# Patient Record
Sex: FEMALE | Race: WHITE | NOT HISPANIC OR LATINO | Employment: FULL TIME | URBAN - METROPOLITAN AREA
[De-identification: names, ages, dates, MRNs, and addresses within clinical notes are randomized per-mention and may not be internally consistent; named-entity substitution may affect disease eponyms.]

---

## 2018-07-27 ENCOUNTER — APPOINTMENT (EMERGENCY)
Dept: ULTRASOUND IMAGING | Facility: HOSPITAL | Age: 28
End: 2018-07-27
Payer: COMMERCIAL

## 2018-07-27 ENCOUNTER — HOSPITAL ENCOUNTER (EMERGENCY)
Facility: HOSPITAL | Age: 28
Discharge: HOME | End: 2018-07-27
Attending: EMERGENCY MEDICINE | Admitting: EMERGENCY MEDICINE
Payer: COMMERCIAL

## 2018-07-27 VITALS
SYSTOLIC BLOOD PRESSURE: 143 MMHG | HEIGHT: 66 IN | RESPIRATION RATE: 16 BRPM | HEART RATE: 107 BPM | OXYGEN SATURATION: 99 % | WEIGHT: 244.2 LBS | BODY MASS INDEX: 39.25 KG/M2 | TEMPERATURE: 99.3 F | DIASTOLIC BLOOD PRESSURE: 71 MMHG

## 2018-07-27 DIAGNOSIS — R10.9 ABDOMINAL PAIN DURING PREGNANCY IN FIRST TRIMESTER: Primary | ICD-10-CM

## 2018-07-27 DIAGNOSIS — O26.891 ABDOMINAL PAIN DURING PREGNANCY IN FIRST TRIMESTER: Primary | ICD-10-CM

## 2018-07-27 LAB
ANION GAP SERPL CALCULATED.3IONS-SCNC: 9 MMOL/L (ref 4–13)
B-HCG SERPL-ACNC: ABNORMAL MIU/ML
BACTERIA UR QL AUTO: ABNORMAL /HPF
BASOPHILS # BLD AUTO: 0.03 THOUSANDS/ΜL (ref 0–0.1)
BASOPHILS NFR BLD AUTO: 0 % (ref 0–1)
BILIRUB UR QL STRIP: ABNORMAL
BUN SERPL-MCNC: 7 MG/DL (ref 5–25)
CALCIUM SERPL-MCNC: 8.7 MG/DL (ref 8.3–10.1)
CHLORIDE SERPL-SCNC: 101 MMOL/L (ref 100–108)
CLARITY UR: ABNORMAL
CO2 SERPL-SCNC: 22 MMOL/L (ref 21–32)
COLOR UR: ABNORMAL
CREAT SERPL-MCNC: 0.55 MG/DL (ref 0.6–1.3)
EOSINOPHIL # BLD AUTO: 0.13 THOUSAND/ΜL (ref 0–0.61)
EOSINOPHIL NFR BLD AUTO: 1 % (ref 0–6)
ERYTHROCYTE [DISTWIDTH] IN BLOOD BY AUTOMATED COUNT: 13.8 % (ref 11.6–15.1)
EXT PREG TEST URINE: POSITIVE
GFR SERPL CREATININE-BSD FRML MDRD: 130 ML/MIN/1.73SQ M
GLUCOSE SERPL-MCNC: 76 MG/DL (ref 65–140)
GLUCOSE UR STRIP-MCNC: NEGATIVE MG/DL
HCT VFR BLD AUTO: 38.8 % (ref 34.8–46.1)
HGB BLD-MCNC: 13.1 G/DL (ref 11.5–15.4)
HGB UR QL STRIP.AUTO: NEGATIVE
KETONES UR STRIP-MCNC: ABNORMAL MG/DL
LEUKOCYTE ESTERASE UR QL STRIP: ABNORMAL
LYMPHOCYTES # BLD AUTO: 2.27 THOUSANDS/ΜL (ref 0.6–4.47)
LYMPHOCYTES NFR BLD AUTO: 20 % (ref 14–44)
MCH RBC QN AUTO: 27 PG (ref 26.8–34.3)
MCHC RBC AUTO-ENTMCNC: 33.8 G/DL (ref 31.4–37.4)
MCV RBC AUTO: 80 FL (ref 82–98)
MONOCYTES # BLD AUTO: 0.82 THOUSAND/ΜL (ref 0.17–1.22)
MONOCYTES NFR BLD AUTO: 7 % (ref 4–12)
NEUTROPHILS # BLD AUTO: 8.11 THOUSANDS/ΜL (ref 1.85–7.62)
NEUTS SEG NFR BLD AUTO: 71 % (ref 43–75)
NITRITE UR QL STRIP: NEGATIVE
NON-SQ EPI CELLS URNS QL MICRO: ABNORMAL /HPF
PH UR STRIP.AUTO: 6 [PH] (ref 4.5–8)
PLATELET # BLD AUTO: 365 THOUSANDS/UL (ref 149–390)
PMV BLD AUTO: 9.3 FL (ref 8.9–12.7)
POTASSIUM SERPL-SCNC: 4 MMOL/L (ref 3.5–5.3)
PROT UR STRIP-MCNC: NEGATIVE MG/DL
RBC # BLD AUTO: 4.85 MILLION/UL (ref 3.81–5.12)
RBC #/AREA URNS AUTO: ABNORMAL /HPF
SODIUM SERPL-SCNC: 132 MMOL/L (ref 136–145)
SP GR UR STRIP.AUTO: 1.02 (ref 1–1.03)
UROBILINOGEN UR QL STRIP.AUTO: 0.2 E.U./DL
WBC # BLD AUTO: 11.36 THOUSAND/UL (ref 4.31–10.16)
WBC #/AREA URNS AUTO: ABNORMAL /HPF

## 2018-07-27 PROCEDURE — 36415 COLL VENOUS BLD VENIPUNCTURE: CPT | Performed by: EMERGENCY MEDICINE

## 2018-07-27 PROCEDURE — 81001 URINALYSIS AUTO W/SCOPE: CPT

## 2018-07-27 PROCEDURE — 96374 THER/PROPH/DIAG INJ IV PUSH: CPT

## 2018-07-27 PROCEDURE — 76801 OB US < 14 WKS SINGLE FETUS: CPT

## 2018-07-27 PROCEDURE — 85025 COMPLETE CBC W/AUTO DIFF WBC: CPT | Performed by: EMERGENCY MEDICINE

## 2018-07-27 PROCEDURE — 80048 BASIC METABOLIC PNL TOTAL CA: CPT | Performed by: EMERGENCY MEDICINE

## 2018-07-27 PROCEDURE — 84702 CHORIONIC GONADOTROPIN TEST: CPT | Performed by: EMERGENCY MEDICINE

## 2018-07-27 PROCEDURE — 81025 URINE PREGNANCY TEST: CPT | Performed by: EMERGENCY MEDICINE

## 2018-07-27 PROCEDURE — 99284 EMERGENCY DEPT VISIT MOD MDM: CPT

## 2018-07-27 RX ORDER — ONDANSETRON 2 MG/ML
4 INJECTION INTRAMUSCULAR; INTRAVENOUS ONCE
Status: COMPLETED | OUTPATIENT
Start: 2018-07-27 | End: 2018-07-27

## 2018-07-27 RX ADMIN — ONDANSETRON 4 MG: 2 INJECTION INTRAMUSCULAR; INTRAVENOUS at 11:57

## 2018-07-27 NOTE — DISCHARGE INSTRUCTIONS
Abdominal Pain in Pregnancy   WHAT YOU NEED TO KNOW:   Abdominal pain during pregnancy is common  Some of the causes include heartburn, constipation, gas, false labor, and round ligament pain  Round ligament pain is caused by stretching of the ligaments that support your uterus  Abdominal pain may be caused by a health problem, such as a stomach virus or appendicitis (inflammation of the appendix)  The pain may also be caused by a problem with your pregnancy, such as a threatened miscarriage or  labor  DISCHARGE INSTRUCTIONS:   Follow up with your obstetrician within 3 days:  Write down your questions so you remember to ask them during your visits  Self-care:   · Rest may help to relieve round ligament pain  Ask your healthcare provider about other ways to relieve this pain, such as a supportive belt or pregnancy exercises  · Use a heating pad set to the lowest setting or a hot compress to apply heat to your abdomen  Do this for 20 to 30 minutes every 2 hours for as many days as directed  · Avoid quick changes in position or movements that cause pain  · Do not lie flat in bed or bend over if you have heartburn  Ask your obstetrician if you should make any changes to the foods you eat  Ask if you can take any medicines for heartburn  · Eat more fiber and drink more liquids to relieve constipation  Fiber is found in fruits, vegetables, and whole-grain foods, such as whole-wheat bread and cereals  Ask how much liquid to drink each day and which liquids are best for you  Contact your obstetrician if:  · You continue to have abdominal pain that cannot be relieved  · You have a fever  · You have questions or concerns about your condition or care  Return to the emergency department if:   · You have sudden, severe pain or cramps that are so bad that you cannot walk or talk  · You have a fast heartbeat, shortness of breath, and you feel lightheaded or faint       · You have vaginal bleeding or discharge  · You have nausea, vomiting, fever, and severe pain on your right side  © 2017 [address] Information is for End User's use only and may not be sold, redistributed or otherwise used for commercial purposes  All illustrations and images included in CareNotes® are the copyrighted property of [employer]  or [employer]  The above information is an  only  It is not intended as medical advice for individual conditions or treatments  Talk to your doctor, nurse or pharmacist before following any medical regimen to see if it is safe and effective for you

## 2018-07-27 NOTE — ED PROVIDER NOTES
History  Chief Complaint   Patient presents with    Abdominal Pain Pregnant     patient reports having abdominal pain (sharp) with "ovary pain" that started today  nausea present  patient reports taking +pregnancy test "2 sundays ago"  OBGYN appt  on August 6h     34year-old female presents with chief complaint of bilateral lower abdominal pain which she describes as ovary pain  Onset was today  Patient reports she is approximately 2 weeks late for her period and took a pregnancy test 2 weeks ago which was reportedly positive  Patient has a history of 1 prior pregnancy resulting in a live birth delivered vaginally  No fevers, chills, dysuria, increased urinary frequency  Patient does report some mild right flank pain  Patient states that she has had a cold recently and has some abdominal pain with coughing as well  History provided by:  Patient   used: No    Abdominal Pain   Pain location: lower abdomen bilaterally  Pain quality: aching and sharp    Pain radiates to:  Does not radiate  Pain severity:  Mild  Onset quality:  Sudden  Duration:  1 day  Timing:  Constant  Progression:  Unchanged  Chronicity:  New  Relieved by:  Nothing  Worsened by:  Palpation  Ineffective treatments:  None tried  Associated symptoms: cough and nausea    Associated symptoms: no chest pain, no chills, no constipation, no diarrhea, no dysuria, no fever, no hematuria, no shortness of breath and no vomiting    Risk factors: obesity        None       History reviewed  No pertinent past medical history  History reviewed  No pertinent surgical history  History reviewed  No pertinent family history  I have reviewed and agree with the history as documented      Social History   Substance Use Topics    Smoking status: Current Every Day Smoker     Packs/day: 1 00     Types: Cigarettes    Smokeless tobacco: Never Used      Comment: 1 pack every 3-4 days    Alcohol use No        Review of Systems Constitutional: Negative for chills, diaphoresis and fever  Respiratory: Positive for cough  Negative for shortness of breath  Cardiovascular: Negative for chest pain and palpitations  Gastrointestinal: Positive for abdominal pain and nausea  Negative for constipation, diarrhea and vomiting  Genitourinary: Negative for dysuria, frequency and hematuria  Skin: Negative for rash  All other systems reviewed and are negative  Physical Exam  Physical Exam   Constitutional: She is oriented to person, place, and time  She appears well-developed and well-nourished  She appears distressed (mild)  HENT:   Head: Normocephalic and atraumatic  Eyes: EOM are normal  Pupils are equal, round, and reactive to light  Neck: Normal range of motion  No JVD present  Cardiovascular: Normal rate, regular rhythm, normal heart sounds and intact distal pulses  Exam reveals no gallop and no friction rub  No murmur heard  Pulmonary/Chest: Effort normal and breath sounds normal  No respiratory distress  She has no wheezes  She has no rales  She exhibits no tenderness  Abdominal: Soft  She exhibits no distension and no mass  There is tenderness (mild bilateral pelvic)  There is no rebound  No hernia  Musculoskeletal: Normal range of motion  She exhibits no tenderness  Neurological: She is alert and oriented to person, place, and time  Skin: Skin is warm and dry  Psychiatric: She has a normal mood and affect  Her behavior is normal  Judgment and thought content normal    Nursing note and vitals reviewed        Vital Signs  ED Triage Vitals [07/27/18 1123]   Temperature Pulse Respirations Blood Pressure SpO2   99 3 °F (37 4 °C) (!) 107 16 143/71 99 %      Temp Source Heart Rate Source Patient Position - Orthostatic VS BP Location FiO2 (%)   Oral Monitor Sitting Left arm --      Pain Score       2           Vitals:    07/27/18 1123   BP: 143/71   Pulse: (!) 107   Patient Position - Orthostatic VS: Sitting Visual Acuity      ED Medications  Medications   ondansetron (ZOFRAN) injection 4 mg (4 mg Intravenous Given 7/27/18 1157)       Diagnostic Studies  Results Reviewed     Procedure Component Value Units Date/Time    Quantitative hCG [[id number]]  (Abnormal) Collected:  07/27/18 1155    Lab Status:  Final result Specimen:  Blood from Arm, Right Updated:  07/27/18 1254     HCG, Quant 24,368 (H) mIU/mL     Narrative:          Expected Ranges:     Approximate               Approximate HCG  Gestation age          Concentration ( mIU/mL)  _____________          ______________________   Melvin Perking                      HCG values  0 2-1                       5-50  1-2                           2-3                         100-5000  3-4                         500-83743  4-5                         1000-28778  5-6                         [id number]  6-8                         [id number]  8-12                        [id number]    Basic metabolic panel [[id number]]  (Abnormal) Collected:  07/27/18 1155    Lab Status:  Final result Specimen:  Blood from Arm, Right Updated:  07/27/18 1252     Sodium 132 (L) mmol/L      Potassium 4 0 mmol/L      Chloride 101 mmol/L      CO2 22 mmol/L      Anion Gap 9 mmol/L      BUN 7 mg/dL      Creatinine 0 55 (L) mg/dL      Glucose 76 mg/dL      Calcium 8 7 mg/dL      eGFR 130 ml/min/1 73sq m     Narrative:         National Kidney Disease Education Program recommendations are as follows:  GFR calculation is accurate only with a steady state creatinine  Chronic Kidney disease less than 60 ml/min/1 73 sq  meters  Kidney failure less than 15 ml/min/1 73 sq  meters      Urine Microscopic [[id number]]  (Abnormal) Collected:  07/27/18 1158    Lab Status:  Final result Specimen:  Urine from Urine, Clean Catch Updated:  07/27/18 1225     RBC, UA None Seen /hpf      WBC, UA 2-4 (A) /hpf      Epithelial Cells Occasional /hpf      Bacteria, UA None Seen /hpf     CBC and differential [[id number]] (Abnormal) Collected:  07/27/18 1155    Lab Status:  Final result Specimen:  Blood from Arm, Right Updated:  07/27/18 1207     WBC 11 36 (H) Thousand/uL      RBC 4 85 Million/uL      Hemoglobin 13 1 g/dL      Hematocrit 38 8 %      MCV 80 (L) fL      MCH 27 0 pg      MCHC 33 8 g/dL      RDW 13 8 %      MPV 9 3 fL      Platelets 331 Thousands/uL      Neutrophils Relative 71 %      Lymphocytes Relative 20 %      Monocytes Relative 7 %      Eosinophils Relative 1 %      Basophils Relative 0 %      Neutrophils Absolute 8 11 (H) Thousands/µL      Lymphocytes Absolute 2 27 Thousands/µL      Monocytes Absolute 0 82 Thousand/µL      Eosinophils Absolute 0 13 Thousand/µL      Basophils Absolute 0 03 Thousands/µL     ED Urine Macroscopic [[id number]]  (Abnormal) Collected:  07/27/18 1158    Lab Status:  Final result Specimen:  Urine Updated:  07/27/18 1150     Color, UA Niki     Clarity, UA Cloudy     pH, UA 6 0     Leukocytes, UA Small (A)     Nitrite, UA Negative     Protein, UA Negative mg/dl      Glucose, UA Negative mg/dl      Ketones, UA 15 (1+) (A) mg/dl      Urobilinogen, UA 0 2 E U /dl      Bilirubin, UA Interference- unable to analyze (A)     Blood, UA Negative     Specific Gravity, UA 1 025    Narrative:       CLINITEK RESULT    POCT pregnancy, urine [[id number]]  (Normal) Resulted:  07/27/18 1147    Lab Status:  Final result Updated:  07/27/18 1147     EXT PREG TEST UR (Ref: Negative) Positive                 US OB < 14 weeks with transvaginal   Final Result by Lisbeth Alberts MD (07/27 1252)      Single live intrauterine gestation at 6 weeks 1 days (range +/- 3 days)  ANNA of 3/20/2019              Workstation performed: [id number]                    Procedures  Procedures       Phone Contacts  ED Phone Contact    ED Course                               MDM  Number of Diagnoses or Management Options  Abdominal pain during pregnancy in first trimester: new and requires workup  Diagnosis management comments: Background: 1st trimester abdominal/pelvic pain    Differential DX includes but is not limited to: ectopic pregnancy, non-specific abdominal pain related to pregnancy, colitis, enteritis,     Plan: cbc, metabolic panel, quantitative hcg, ultrasound       Amount and/or Complexity of Data Reviewed  Clinical lab tests: ordered and reviewed  Tests in the radiology section of CPT®: ordered and reviewed    Risk of Complications, Morbidity, and/or Mortality  Presenting problems: high  Diagnostic procedures: high  Management options: high    Patient Progress  Patient progress: stable    CritCare Time    Disposition  Final diagnoses:   Abdominal pain during pregnancy in first trimester     Time reflects when diagnosis was documented in both MDM as applicable and the Disposition within this note     Time User Action Codes Description Comment    7/27/2018 12:59 PM Gabby Pulse Add [O26 [id number],  R10 9] Abdominal pain during pregnancy in first trimester       ED Disposition     ED Disposition Condition Comment    Discharge  Gini Speaker discharge to home/self care  Condition at discharge: Good        Follow-up Information     Follow up With Specialties Details Why Contact Info    your OBGYN  Schedule an appointment as soon as possible for a visit            Patient's Medications    No medications on file     No discharge procedures on file      ED Provider  Electronically Signed by           Star Don MD  07/27/18 1300

## 2018-09-12 ENCOUNTER — HOSPITAL ENCOUNTER (EMERGENCY)
Facility: HOSPITAL | Age: 28
Discharge: HOME | End: 2018-09-12
Attending: EMERGENCY MEDICINE
Payer: COMMERCIAL

## 2018-09-12 VITALS
HEART RATE: 99 BPM | TEMPERATURE: 99 F | DIASTOLIC BLOOD PRESSURE: 79 MMHG | RESPIRATION RATE: 18 BRPM | BODY MASS INDEX: 42.65 KG/M2 | HEIGHT: 65 IN | OXYGEN SATURATION: 100 % | WEIGHT: 256 LBS | SYSTOLIC BLOOD PRESSURE: 140 MMHG

## 2018-09-12 DIAGNOSIS — K02.9 PAIN DUE TO DENTAL CARIES: Primary | ICD-10-CM

## 2018-09-12 PROCEDURE — 99283 EMERGENCY DEPT VISIT LOW MDM: CPT

## 2018-09-12 RX ORDER — IBUPROFEN 600 MG/1
600 TABLET ORAL ONCE
Status: DISCONTINUED | OUTPATIENT
Start: 2018-09-12 | End: 2018-09-12

## 2018-09-12 RX ORDER — PENICILLIN V POTASSIUM 500 MG/1
500 TABLET ORAL 4 TIMES DAILY
Qty: 40 TABLET | Refills: 0 | Status: SHIPPED | OUTPATIENT
Start: 2018-09-12 | End: 2018-09-19

## 2018-09-13 NOTE — ED PROVIDER NOTES
History  Chief Complaint   Patient presents with    Dental Pain     patient reports having dental pain lower right jaw for 2 days, abscess  difficilty with eating/swallowing  denies earache/throat ache  61-year-old pregnant female, 13 weeks gestation, presents for evaluation of dental pain  Broke a right lower molar a long time ago, but in the last couple of days has had pain in that area radiating to her jaw  No abscess noted  No swelling of her face  She does not notice any change in her voice  No fever or chills  Able to open mouth fully  Complains of a sore throat  No difficulty breathing  Has been taking Tylenol for the pain  Has an appointment with a dentist tomorrow  None       History reviewed  No pertinent past medical history  History reviewed  No pertinent surgical history  History reviewed  No pertinent family history  I have reviewed and agree with the history as documented  Social History   Substance Use Topics    Smoking status: Former Smoker     Packs/day: 1 00     Types: Cigarettes     Quit date: 8/19/2018    Smokeless tobacco: Never Used    Alcohol use No        Review of Systems   Constitutional: Negative for activity change, chills, fatigue and fever  HENT: Positive for dental problem  Negative for congestion, ear pain, facial swelling, nosebleeds, postnasal drip, rhinorrhea, sinus pain, sinus pressure, sore throat, trouble swallowing and voice change  Eyes: Negative for visual disturbance  Respiratory: Negative for cough, chest tightness and shortness of breath  Cardiovascular: Negative for chest pain, palpitations and leg swelling  Gastrointestinal: Negative for abdominal pain, blood in stool, constipation, diarrhea, nausea and vomiting  Genitourinary: Negative for dysuria, flank pain, frequency and hematuria  Musculoskeletal: Negative for arthralgias, back pain, neck pain and neck stiffness  Skin: Negative for rash and wound  Neurological: Negative for dizziness, seizures, syncope, light-headedness, numbness and headaches  All other systems reviewed and are negative  Physical Exam  Physical Exam   Constitutional: She is oriented to person, place, and time  She appears well-developed and well-nourished  No distress  Nontoxic appearance   HENT:   Head: Normocephalic and atraumatic  Right Ear: External ear normal    Left Ear: External ear normal    Mouth/Throat: Oropharynx is clear and moist  No oral lesions  No trismus in the jaw  Dental caries present  No dental abscesses or uvula swelling  No oropharyngeal exudate  No swelling of face noted    The posterior pharynx is without erythema, swelling, suggestion of peritonsillar abscess  Uvula is midline  Tonsils are normal size  No tonsillar exudate  Able to open mouth fully  Eyes: Conjunctivae and EOM are normal  Pupils are equal, round, and reactive to light  Neck: Normal range of motion  Neck supple  No neck pain with motion to suggest retropharyngeal abscess   Pulmonary/Chest: Effort normal    Musculoskeletal: Normal range of motion  Neurological: She is alert and oriented to person, place, and time  Skin: Skin is warm and dry  She is not diaphoretic  Psychiatric: She has a normal mood and affect  Her behavior is normal  Judgment and thought content normal    Vitals reviewed        Vital Signs  ED Triage Vitals   Temperature Pulse Respirations Blood Pressure SpO2   09/12/18 1937 09/12/18 1937 09/12/18 1937 09/12/18 1938 09/12/18 1937   99 °F (37 2 °C) 99 18 140/79 100 %      Temp Source Heart Rate Source Patient Position - Orthostatic VS BP Location FiO2 (%)   09/12/18 1937 09/12/18 1937 09/12/18 1938 09/12/18 1938 --   Oral Monitor Sitting Left arm       Pain Score       09/12/18 1937       7           Vitals:    09/12/18 1937 09/12/18 1938   BP:  140/79   Pulse: 99    Patient Position - Orthostatic VS:  Sitting       Visual Acuity      ED Medications  Medications - No data to display    Diagnostic Studies  Results Reviewed     None                 No orders to display              Procedures  Procedures       Phone Contacts  ED Phone Contact    ED Course                               MDM  Number of Diagnoses or Management Options  Pain due to dental caries: new and requires workup  Diagnosis management comments: 61-year-old female who is 13 weeks pregnant presents for evaluation of dental pain  No abscess on exam, no exam findings to suggest peritonsillar abscess or retropharyngeal abscess  She is well appearing  Will cover with penicillin  Tylenol for pain   She already has an appointment with a dentist tomorrow  Discussed return precautions  She understood and agreed with the treatment plan and had no questions  CritCare Time    Disposition  Final diagnoses:   Pain due to dental caries     Time reflects when diagnosis was documented in both MDM as applicable and the Disposition within this note     Time User Action Codes Description Comment    9/12/2018  8:46 PM Musa, [address] [K02 9] Pain due to dental caries       ED Disposition     ED Disposition Condition Comment    Discharge  Terressa Favorite discharge to home/self care  Condition at discharge: Good        Follow-up Information     Follow up With Specialties Details Why Contact Info    Your dentist   Tomorrow as scheduled           Discharge Medication List as of 9/12/2018  8:49 PM      START taking these medications    Details   penicillin V potassium (VEETID) 500 mg tablet Take 1 tablet (500 mg total) by mouth 4 (four) times a day for 7 days, Starting Wed 9/12/2018, Until Wed 9/19/2018, Print           No discharge procedures on file      ED Provider  Electronically Signed by           Doiran Schwarz PA-C  09/13/18 6920

## 2018-09-13 NOTE — ED NOTES
PT awake and alert, no distress noted  No other questions upon d/c       April Guanakito Wheat RN  09/12/18 2100

## 2018-09-13 NOTE — ED NOTES
PT seen and assessed by [facility]     April M Manuel Horton RN  09/12/18 2059

## 2018-09-13 NOTE — DISCHARGE INSTRUCTIONS
Dental Caries   WHAT YOU NEED TO KNOW:   Dental caries are also called cavities  Cavities are caused by bacteria  When the bacteria in tooth plaque (sticky film) mix with certain types of carbohydrate, this creates acid  The acid breaks down areas of enamel, which covers the outside of a tooth  This creates a small hole in the tooth called a cavity  DISCHARGE INSTRUCTIONS:   Seek care immediately if:   · Your face, jaw, cheek, eye, or neck begin to swell  Contact your dentist if:   · You have a fever  · Your tooth pain gets worse  · You have questions or concerns about your condition or care  Follow up with your dentist as directed:  Write down your questions so you remember to ask them during your visits  Prevent dental caries:   · Brush your teeth at least 2 times a day with fluoride toothpaste  · Use dental floss to clean between your teeth at least once a day  · Rinse your mouth with water or mouthwash after meals and snacks  · Chew sugarless gum after meals and snacks  · See your dentist regularly for dental cleanings and oral exams  © 2017 [address] is for End User's use only and may not be sold, redistributed or otherwise used for commercial purposes  All illustrations and images included in CareNotes® are the copyrighted property of [employer] M [employer]  or [employer]  The above information is an  only  It is not intended as medical advice for individual conditions or treatments  Talk to your doctor, nurse or pharmacist before following any medical regimen to see if it is safe and effective for you

## 2019-07-22 ENCOUNTER — HOSPITAL ENCOUNTER (EMERGENCY)
Facility: HOSPITAL | Age: 29
Discharge: HOME | End: 2019-07-22
Attending: EMERGENCY MEDICINE | Admitting: EMERGENCY MEDICINE
Payer: COMMERCIAL

## 2019-07-22 ENCOUNTER — APPOINTMENT (EMERGENCY)
Dept: RADIOLOGY | Facility: HOSPITAL | Age: 29
End: 2019-07-22
Payer: COMMERCIAL

## 2019-07-22 VITALS
SYSTOLIC BLOOD PRESSURE: 135 MMHG | TEMPERATURE: 97.3 F | BODY MASS INDEX: 39.23 KG/M2 | HEIGHT: 65 IN | DIASTOLIC BLOOD PRESSURE: 69 MMHG | RESPIRATION RATE: 18 BRPM | WEIGHT: 235.45 LBS | HEART RATE: 69 BPM | OXYGEN SATURATION: 99 %

## 2019-07-22 DIAGNOSIS — K80.50 BILIARY COLIC: Primary | ICD-10-CM

## 2019-07-22 LAB
ALBUMIN SERPL BCP-MCNC: 3.2 G/DL (ref 3.5–5)
ALP SERPL-CCNC: 91 U/L (ref 46–116)
ALT SERPL W P-5'-P-CCNC: 23 U/L (ref 12–78)
ANION GAP SERPL CALCULATED.3IONS-SCNC: 10 MMOL/L (ref 4–13)
AST SERPL W P-5'-P-CCNC: 16 U/L (ref 5–45)
BASOPHILS # BLD AUTO: 0.07 THOUSANDS/ΜL (ref 0–0.1)
BASOPHILS NFR BLD AUTO: 1 % (ref 0–1)
BILIRUB SERPL-MCNC: 0.2 MG/DL (ref 0.2–1)
BILIRUB UR QL STRIP: NEGATIVE
BUN SERPL-MCNC: 14 MG/DL (ref 5–25)
CALCIUM SERPL-MCNC: 8.7 MG/DL (ref 8.3–10.1)
CHLORIDE SERPL-SCNC: 106 MMOL/L (ref 100–108)
CLARITY UR: NORMAL
CO2 SERPL-SCNC: 26 MMOL/L (ref 21–32)
COLOR UR: YELLOW
CREAT SERPL-MCNC: 0.74 MG/DL (ref 0.6–1.3)
EOSINOPHIL # BLD AUTO: 0.18 THOUSAND/ΜL (ref 0–0.61)
EOSINOPHIL NFR BLD AUTO: 2 % (ref 0–6)
ERYTHROCYTE [DISTWIDTH] IN BLOOD BY AUTOMATED COUNT: 13.7 % (ref 11.6–15.1)
EXT PREG TEST URINE: NEGATIVE
EXT. CONTROL ED NAV: NORMAL
GFR SERPL CREATININE-BSD FRML MDRD: 111 ML/MIN/1.73SQ M
GLUCOSE SERPL-MCNC: 100 MG/DL (ref 65–140)
GLUCOSE UR STRIP-MCNC: NEGATIVE MG/DL
HCT VFR BLD AUTO: 37.9 % (ref 34.8–46.1)
HGB BLD-MCNC: 11.8 G/DL (ref 11.5–15.4)
HGB UR QL STRIP.AUTO: NEGATIVE
IMM GRANULOCYTES # BLD AUTO: 0.02 THOUSAND/UL (ref 0–0.2)
IMM GRANULOCYTES NFR BLD AUTO: 0 % (ref 0–2)
KETONES UR STRIP-MCNC: NEGATIVE MG/DL
LEUKOCYTE ESTERASE UR QL STRIP: NEGATIVE
LIPASE SERPL-CCNC: 136 U/L (ref 73–393)
LYMPHOCYTES # BLD AUTO: 2.41 THOUSANDS/ΜL (ref 0.6–4.47)
LYMPHOCYTES NFR BLD AUTO: 28 % (ref 14–44)
MCH RBC QN AUTO: 26.8 PG (ref 26.8–34.3)
MCHC RBC AUTO-ENTMCNC: 31.1 G/DL (ref 31.4–37.4)
MCV RBC AUTO: 86 FL (ref 82–98)
MONOCYTES # BLD AUTO: 0.68 THOUSAND/ΜL (ref 0.17–1.22)
MONOCYTES NFR BLD AUTO: 8 % (ref 4–12)
NEUTROPHILS # BLD AUTO: 5.25 THOUSANDS/ΜL (ref 1.85–7.62)
NEUTS SEG NFR BLD AUTO: 61 % (ref 43–75)
NITRITE UR QL STRIP: NEGATIVE
NRBC BLD AUTO-RTO: 0 /100 WBCS
PH UR STRIP.AUTO: 5.5 [PH] (ref 4.5–8)
PLATELET # BLD AUTO: 342 THOUSANDS/UL (ref 149–390)
PMV BLD AUTO: 9 FL (ref 8.9–12.7)
POTASSIUM SERPL-SCNC: 4 MMOL/L (ref 3.5–5.3)
PROT SERPL-MCNC: 6.8 G/DL (ref 6.4–8.2)
PROT UR STRIP-MCNC: NEGATIVE MG/DL
RBC # BLD AUTO: 4.41 MILLION/UL (ref 3.81–5.12)
SODIUM SERPL-SCNC: 142 MMOL/L (ref 136–145)
SP GR UR STRIP.AUTO: >=1.03 (ref 1–1.03)
UROBILINOGEN UR QL STRIP.AUTO: 0.2 E.U./DL
WBC # BLD AUTO: 8.61 THOUSAND/UL (ref 4.31–10.16)

## 2019-07-22 PROCEDURE — 71045 X-RAY EXAM CHEST 1 VIEW: CPT

## 2019-07-22 PROCEDURE — 80053 COMPREHEN METABOLIC PANEL: CPT | Performed by: PHYSICIAN ASSISTANT

## 2019-07-22 PROCEDURE — 81003 URINALYSIS AUTO W/O SCOPE: CPT

## 2019-07-22 PROCEDURE — 83690 ASSAY OF LIPASE: CPT | Performed by: PHYSICIAN ASSISTANT

## 2019-07-22 PROCEDURE — 96374 THER/PROPH/DIAG INJ IV PUSH: CPT

## 2019-07-22 PROCEDURE — 36415 COLL VENOUS BLD VENIPUNCTURE: CPT | Performed by: PHYSICIAN ASSISTANT

## 2019-07-22 PROCEDURE — 85025 COMPLETE CBC W/AUTO DIFF WBC: CPT | Performed by: PHYSICIAN ASSISTANT

## 2019-07-22 PROCEDURE — 99283 EMERGENCY DEPT VISIT LOW MDM: CPT | Performed by: PHYSICIAN ASSISTANT

## 2019-07-22 PROCEDURE — 81025 URINE PREGNANCY TEST: CPT | Performed by: PHYSICIAN ASSISTANT

## 2019-07-22 PROCEDURE — 99285 EMERGENCY DEPT VISIT HI MDM: CPT

## 2019-07-22 PROCEDURE — 87086 URINE CULTURE/COLONY COUNT: CPT

## 2019-07-22 PROCEDURE — 93005 ELECTROCARDIOGRAM TRACING: CPT

## 2019-07-22 RX ORDER — LIDOCAINE HYDROCHLORIDE 20 MG/ML
15 SOLUTION OROPHARYNGEAL ONCE
Status: DISCONTINUED | OUTPATIENT
Start: 2019-07-22 | End: 2019-07-22

## 2019-07-22 RX ORDER — KETOROLAC TROMETHAMINE 30 MG/ML
15 INJECTION, SOLUTION INTRAMUSCULAR; INTRAVENOUS ONCE
Status: COMPLETED | OUTPATIENT
Start: 2019-07-22 | End: 2019-07-22

## 2019-07-22 RX ORDER — MAGNESIUM HYDROXIDE/ALUMINUM HYDROXICE/SIMETHICONE 120; 1200; 1200 MG/30ML; MG/30ML; MG/30ML
30 SUSPENSION ORAL ONCE
Status: DISCONTINUED | OUTPATIENT
Start: 2019-07-22 | End: 2019-07-22

## 2019-07-22 RX ORDER — PANTOPRAZOLE SODIUM 40 MG/1
40 INJECTION, POWDER, FOR SOLUTION INTRAVENOUS ONCE
Status: DISCONTINUED | OUTPATIENT
Start: 2019-07-22 | End: 2019-07-22

## 2019-07-22 RX ADMIN — KETOROLAC TROMETHAMINE 15 MG: 30 INJECTION, SOLUTION INTRAMUSCULAR at 02:31

## 2019-07-22 NOTE — ED PROVIDER NOTES
Pt Name: Mohan Hoang  MRN: [id number]  [place]: 1990  Age/Sex: 34 y o  female  Date of evaluation: 7/22/2019  PCP: No primary care provider on file  CHIEF COMPLAINT    Chief Complaint   Patient presents with    Chest Pain     Pt presents to ED from home w/ cp starting 5 hours ago worsening  Pt denies cardiac hx  Pt (+) hot flashes  HPI    Mary Hensley presents to the Emergency Department complaining of Epigastric Pain  Mohan Hoang is a 34 y o  female who presents due to Epigastric Pain  Pt reports onset chest/epigastric pain approximately 5 hours ago, worsening  Patient reports this episode like prior, sharp intermittent pain located in the epigastrium, patient points this region  Patient denies radiation of pain  Patient reports this pain usually goes away without medications  Does not change with position, worsened by tactile pressure  Patient reports past episodes similar in severity, though this episode has been longer lasting  Patient reports this episode approximately 1 hour postprandially, notes that she was putting her children to sleep  Patient notes no new life stressors, denies history of high blood pressure, hyperlipidemia, heart issues, blood clots, family history of blood clots, current smoker, does not use oral contraceptives, exogenous estrogen, though notes father did have heart attack prior to 74  Associated symptoms of nausea, lightheadedness, shortness of breath with the intermittent pain  Denies fevers, chills, sweats, back pain, dysuria, hematuria, palpitations, dizziness, visual disturbances, no other complaints at this time  Samm Salas History provided by:  Patient   used: No          Past Medical and Surgical History    History reviewed  No pertinent past medical history  History reviewed  No pertinent surgical history  History reviewed  No pertinent family history      Social History     Tobacco Use    Smoking status: Current Every Day Smoker     Packs/day: 0 20     Types: Cigarettes     Last attempt to quit: 2018     Years since quittin 9    Smokeless tobacco: Never Used   Substance Use Topics    Alcohol use: No    Drug use: No              Allergies    No Known Allergies    Home Medications:    Prior to Admission medications    Not on File           Review of Systems    Review of Systems   Constitutional: Negative for activity change, appetite change, chills, diaphoresis, fatigue, fever and unexpected weight change  Respiratory: Negative for apnea, cough, choking, chest tightness, shortness of breath, wheezing and stridor  Cardiovascular: Positive for chest pain (Epigastric)  Negative for palpitations and leg swelling  Gastrointestinal: Positive for abdominal pain ( epigastric) and nausea  Negative for abdominal distention, constipation, diarrhea and vomiting  Genitourinary: Negative for decreased urine volume, difficulty urinating, dysuria, flank pain, frequency and hematuria  Skin: Negative for rash  Neurological: Positive for light-headedness  Negative for dizziness, facial asymmetry, weakness and headaches  All other systems reviewed and negative  Physical Exam      ED Triage Vitals   Temperature Pulse Respirations Blood Pressure SpO2   19 0150 19 0150 19 0150 19 0153 19 0150   (!) 97 3 °F (36 3 °C) 71 16 152/83 100 %      Temp Source Heart Rate Source Patient Position - Orthostatic VS BP Location FiO2 (%)   19 0150 19 0150 19 0150 19 0150 --   Oral Monitor Sitting Right arm       Pain Score       19 0150       7               Physical Exam   Constitutional: She is oriented to person, place, and time  She appears well-developed and well-nourished  Non-toxic appearance  She does not appear ill  No distress  HENT:   Head: Normocephalic and atraumatic     Right Ear: External ear normal    Left Ear: External ear normal    Nose: Nose normal    Mouth/Throat: Oropharynx is clear and moist  No oropharyngeal exudate  Eyes: Pupils are equal, round, and reactive to light  Conjunctivae and EOM are normal    Neck: Normal range of motion  Neck supple  No hepatojugular reflux and no JVD present  Carotid bruit is not present  Cardiovascular: Normal rate, regular rhythm, normal heart sounds, intact distal pulses and normal pulses  No extrasystoles are present  PMI is not displaced  Exam reveals no gallop and no friction rub  No murmur heard  Pulses:       Radial pulses are 2+ on the right side, and 2+ on the left side  Dorsalis pedis pulses are 2+ on the right side, and 2+ on the left side  Posterior tibial pulses are 2+ on the right side, and 2+ on the left side  Pulmonary/Chest: Effort normal and breath sounds normal  No stridor  No respiratory distress  She has no wheezes  She has no rales  She exhibits no tenderness  Abdominal: Soft  Bowel sounds are normal  She exhibits no distension and no mass  There is no hepatosplenomegaly  There is tenderness (mild) in the right upper quadrant and epigastric area  There is no rigidity, no rebound, no guarding, no CVA tenderness, no tenderness at McBurney's point and negative Rapp's sign  No hernia  Negative Rapp's  Negative Appendiceal signs (Psoas, Rovsing's, Obturator)  Negative Peritoneal Signs   Musculoskeletal: Normal range of motion  Neurological: She is alert and oriented to person, place, and time  Skin: Skin is warm and dry  Capillary refill takes less than 2 seconds  She is not diaphoretic  Nursing note and vitals reviewed            Diagnostic Results    ECG      Labs:    Results for orders placed or performed during the hospital encounter of 07/22/19   Comprehensive metabolic panel   Result Value Ref Range    Sodium 142 136 - 145 mmol/L    Potassium 4 0 3 5 - 5 3 mmol/L    Chloride 106 100 - 108 mmol/L    CO2 26 21 - 32 mmol/L    ANION GAP 10 4 - 13 mmol/L BUN 14 5 - 25 mg/dL    Creatinine 0 74 0 60 - 1 30 mg/dL    Glucose 100 65 - 140 mg/dL    Calcium 8 7 8 3 - 10 1 mg/dL    AST 16 5 - 45 U/L    ALT 23 12 - 78 U/L    Alkaline Phosphatase 91 46 - 116 U/L    Total Protein 6 8 6 4 - 8 2 g/dL    Albumin 3 2 (L) 3 5 - 5 0 g/dL    Total Bilirubin 0 20 0 20 - 1 00 mg/dL    eGFR 111 ml/min/1 73sq m   CBC and differential   Result Value Ref Range    WBC 8 61 4 31 - 10 16 Thousand/uL    RBC 4 41 3 81 - 5 12 Million/uL    Hemoglobin 11 8 11 5 - 15 4 g/dL    Hematocrit 37 9 34 8 - 46 1 %    MCV 86 82 - 98 fL    MCH 26 8 26 8 - 34 3 pg    MCHC 31 1 (L) 31 4 - 37 4 g/dL    RDW 13 7 11 6 - 15 1 %    MPV 9 0 8 9 - 12 7 fL    Platelets 687 539 - 254 Thousands/uL    nRBC 0 /100 WBCs    Neutrophils Relative 61 43 - 75 %    Immat GRANS % 0 0 - 2 %    Lymphocytes Relative 28 14 - 44 %    Monocytes Relative 8 4 - 12 %    Eosinophils Relative 2 0 - 6 %    Basophils Relative 1 0 - 1 %    Neutrophils Absolute 5 25 1 85 - 7 62 Thousands/µL    Immature Grans Absolute 0 02 0 00 - 0 20 Thousand/uL    Lymphocytes Absolute 2 41 0 60 - 4 47 Thousands/µL    Monocytes Absolute 0 68 0 17 - 1 22 Thousand/µL    Eosinophils Absolute 0 18 0 00 - 0 61 Thousand/µL    Basophils Absolute 0 07 0 00 - 0 10 Thousands/µL   Lipase   Result Value Ref Range    Lipase 136 73 - 393 u/L   POCT pregnancy, urine   Result Value Ref Range    EXT PREG TEST UR (Ref: Negative) Negative     Control Valid    ED Urine Macroscopic   Result Value Ref Range    Color, UA Yellow     Clarity, UA Slightly Cloudy     pH, UA 5 5 4 5 - 8 0    Leukocytes, UA Negative Negative    Nitrite, UA Negative Negative    Protein, UA Negative Negative mg/dl    Glucose, UA Negative Negative mg/dl    Ketones, UA Negative Negative mg/dl    Urobilinogen, UA 0 2 0 2, 1 0 E U /dl E U /dl    Bilirubin, UA Negative Negative    Blood, UA Negative Negative    Specific Gravity, UA >=1 030 1 003 - 1 030       All labs reviewed and utilized in the medical decision making process    Radiology:    XR chest 1 view portable    (Results Pending)       All radiology studies independently viewed by me and interpreted by the radiologist     Procedure    ECG 12 Lead Documentation Only  Date/Time: 7/22/2019 2:31 AM  Performed by: Bonnie Rand PA-C  Authorized by: Bonnie Rand PA-C     Indications / Diagnosis:  Epigastric pain  ECG reviewed by me, the ED Provider: yes    Patient location:  ED  Previous ECG:     Comparison to cardiac monitor: Yes    Interpretation:     Interpretation: normal    Rate:     ECG rate:  71    ECG rate assessment: normal    Rhythm:     Rhythm: sinus rhythm    Ectopy:     Ectopy: none    QRS:     QRS axis:  Normal    QRS intervals:  Normal  Conduction:     Conduction: normal    ST segments:     ST segments:  Normal  T waves:     T waves: normal            Assessment and Plan    MDM  Number of Diagnoses or Management Options  Biliary colic: new, needed workup     Amount and/or Complexity of Data Reviewed  Clinical lab tests: ordered and reviewed  Tests in the radiology section of CPT®: reviewed and ordered  Tests in the medicine section of CPT®: ordered and reviewed  Review and summarize past medical records: yes  Independent visualization of images, tracings, or specimens: yes    Risk of Complications, Morbidity, and/or Mortality  Presenting problems: moderate  Diagnostic procedures: moderate  Management options: moderate    Patient Progress  Patient progress: stable      Initial ED assessment:  Mo Golden is a 34 y o  female with no significant PMH who presents with Chest Pain  Vitals signs reviewed and WNL  Physical examination remarkable for mild RUQ, Epigastric TTP      Initial Ddx  includes but is not limited to:  cholecystitis, biliary colic, gastritis, PUD, GERD, gastroparesis, hepatitis,doubt appendicitis, gastroenteritis,pancreatitis, colitis, enteritis, food poisoning, mesenteric adenitis, IBD, IBS, ileus, bowel obstruction, volvulus, cholecystitis, biliary colic, choledocholithiasis, perforated viscus, splenic etiology, diverticulitis, internal hernia, constipation, pelvic pathology, renal colic, pyelonephritis, UTI  Initial ED plan:   Plan will be to perform diagnostic testing of CBC, CMP, Lipase, EKG, CXR and treat symptomatically  Final ED summary/disposition:     Coding    ED Course of Care and Re-Assessments    ED Course as of Jul 22 0510   Mon Jul 22, 2019   0226 PREGNANCY TEST URINE: Negative   0236 Lipase: 136   0236 WBC: 8 61   0320 RUQ US performed by me with evidence of cholelithiasis, no pericholecystic fluid, wall thickening--unable to visualize CBD, cystic duct though no elevated transaminases, bilirubin  Discussed results of diagnostic testing with pt in detail  Home care recommendations given with discharge paperwork  Return to ED instructions given if new/worsening sxs                  PERC Rule for PE      Most Recent Value   PERC Rule for PE   Age >=50  0 Filed at: 07/22/2019 0205   HR >=100  0 Filed at: 07/22/2019 0205   O2 Sat on room air < 95%  0 Filed at: 07/22/2019 0205   History of PE or DVT  0 Filed at: 07/22/2019 0205   Recent trauma or surgery  0 Filed at: 07/22/2019 0205   Hemoptysis  0 Filed at: 07/22/2019 0205   Exogenous estrogen  0 Filed at: 07/22/2019 0205   Unilateral leg swelling  0 Filed at: 07/22/2019 0205   PERC Rule for PE Results  0 Filed at: 07/22/2019 0205        Wells' Criteria for PE      Most Recent Value   Wells' Criteria for PE   Clinical signs and symptoms of DVT  0 Filed at: 07/22/2019 0205   PE is primary diagnosis or equally likely  0 Filed at: 07/22/2019 0205   HR >100  0 Filed at: 07/22/2019 0205   Immobilization at least 3 days or Surgery in the previous 4 weeks  0 Filed at: 07/22/2019 0205   Previous, objectively diagnosed PE or DVT  0 Filed at: 07/22/2019 0205   Hemoptysis  0 Filed at: 07/22/2019 0205   Malignancy with treatment within 6 months or palliative  0 Filed at: 07/22/2019 0205   Wells' Criteria Total  0 Filed at: 07/22/2019 0205                     Medications   ketorolac (TORADOL) injection 15 mg (15 mg Intravenous Given 7/22/19 0231)         FINAL IMPRESSION    Final diagnoses:   Biliary colic         DISPOSITION/PLAN  Time reflects when diagnosis was documented in both MDM as applicable and the Disposition within this note     Time User Action Codes Description Comment    7/22/2019  3:18 AM Lane Patterson Darcie [W95 71] Biliary colic       ED Disposition     ED Disposition Condition Date/Time Comment    Discharge Stable Mon Jul 22, 2019  3:18 AM Ericka Westfall discharge to home/self care  Follow-up Information     Follow up With Specialties Details Why Contact Info Additional [address] Emergency Department Emergency Medicine Go to  For follow up [address] [facility] [address] ED,  [address], [place], [place], [facility] Call  For follow up for primary care [address] [facility] [id number]  [address] [facility] Internal [facility] [place] [facility] [place], [address] [place] [address] NEUROREHAB [place], [place], [address] [facility] Call  For follow up [address] [facility] [address]  [address] [phone]                 PATIENT REFERRED TO:    Caitlin [address] Emergency Department  [address] [facility],[address]  [phone]  Go to   For follow up    [facility] Internal Medicine [address] [place] [address] Avenue  [phone]  Call   For follow up for primary care    [facility], 0 [facility] [id number] [place] [address]  [address]  [address] [facility]  [address]  [phone]    Call   For follow up      [address]:    There are no discharge medications for this patient  No discharge procedures on file           WILBER Moreno, WILBER  07/22/19 0510

## 2019-07-23 LAB — BACTERIA UR CULT: NORMAL

## 2019-07-24 LAB
ATRIAL RATE: 71 BPM
P AXIS: 44 DEGREES
PR INTERVAL: 188 MS
QRS AXIS: 1 DEGREES
QRSD INTERVAL: 80 MS
QT INTERVAL: 378 MS
QTC INTERVAL: 410 MS
T WAVE AXIS: -7 DEGREES
VENTRICULAR RATE: 71 BPM

## 2019-07-24 PROCEDURE — 93010 ELECTROCARDIOGRAM REPORT: CPT | Performed by: INTERNAL MEDICINE

## 2019-07-31 PROCEDURE — 99285 EMERGENCY DEPT VISIT HI MDM: CPT

## 2019-08-01 ENCOUNTER — APPOINTMENT (EMERGENCY)
Dept: CT IMAGING | Facility: HOSPITAL | Age: 29
End: 2019-08-01
Payer: COMMERCIAL

## 2019-08-01 ENCOUNTER — HOSPITAL ENCOUNTER (OUTPATIENT)
Facility: HOSPITAL | Age: 29
Setting detail: OUTPATIENT SURGERY
Discharge: HOME | End: 2019-08-02
Attending: EMERGENCY MEDICINE | Admitting: SURGERY
Payer: COMMERCIAL

## 2019-08-01 ENCOUNTER — ANESTHESIA (OUTPATIENT)
Dept: PERIOP | Facility: HOSPITAL | Age: 29
End: 2019-08-01
Payer: COMMERCIAL

## 2019-08-01 ENCOUNTER — APPOINTMENT (INPATIENT)
Dept: ULTRASOUND IMAGING | Facility: HOSPITAL | Age: 29
End: 2019-08-01
Payer: COMMERCIAL

## 2019-08-01 ENCOUNTER — ANESTHESIA EVENT (OUTPATIENT)
Dept: PERIOP | Facility: HOSPITAL | Age: 29
End: 2019-08-01
Payer: COMMERCIAL

## 2019-08-01 DIAGNOSIS — K80.20 CALCULUS OF GALLBLADDER WITHOUT CHOLECYSTITIS WITHOUT OBSTRUCTION: ICD-10-CM

## 2019-08-01 DIAGNOSIS — K80.20 CHOLELITHIASIS: ICD-10-CM

## 2019-08-01 DIAGNOSIS — K80.50 BILIARY COLIC: Primary | ICD-10-CM

## 2019-08-01 LAB
ALBUMIN SERPL BCP-MCNC: 3.6 G/DL (ref 3.5–5)
ALP SERPL-CCNC: 99 U/L (ref 46–116)
ALT SERPL W P-5'-P-CCNC: 25 U/L (ref 12–78)
ANION GAP SERPL CALCULATED.3IONS-SCNC: 9 MMOL/L (ref 4–13)
AST SERPL W P-5'-P-CCNC: 20 U/L (ref 5–45)
BACTERIA UR QL AUTO: ABNORMAL /HPF
BASOPHILS # BLD AUTO: 0.07 THOUSANDS/ΜL (ref 0–0.1)
BASOPHILS NFR BLD AUTO: 1 % (ref 0–1)
BILIRUB SERPL-MCNC: 0.1 MG/DL (ref 0.2–1)
BILIRUB UR QL STRIP: NEGATIVE
BUN SERPL-MCNC: 11 MG/DL (ref 5–25)
CALCIUM SERPL-MCNC: 9 MG/DL (ref 8.3–10.1)
CHLORIDE SERPL-SCNC: 105 MMOL/L (ref 100–108)
CLARITY UR: CLEAR
CO2 SERPL-SCNC: 28 MMOL/L (ref 21–32)
COLOR UR: YELLOW
CREAT SERPL-MCNC: 0.71 MG/DL (ref 0.6–1.3)
EOSINOPHIL # BLD AUTO: 0.16 THOUSAND/ΜL (ref 0–0.61)
EOSINOPHIL NFR BLD AUTO: 2 % (ref 0–6)
ERYTHROCYTE [DISTWIDTH] IN BLOOD BY AUTOMATED COUNT: 13.7 % (ref 11.6–15.1)
EXT PREG TEST URINE: NEGATIVE
EXT. CONTROL ED NAV: NORMAL
GFR SERPL CREATININE-BSD FRML MDRD: 117 ML/MIN/1.73SQ M
GLUCOSE SERPL-MCNC: 101 MG/DL (ref 65–140)
GLUCOSE UR STRIP-MCNC: NEGATIVE MG/DL
HCT VFR BLD AUTO: 39.1 % (ref 34.8–46.1)
HGB BLD-MCNC: 12.5 G/DL (ref 11.5–15.4)
HGB UR QL STRIP.AUTO: NEGATIVE
IMM GRANULOCYTES # BLD AUTO: 0.03 THOUSAND/UL (ref 0–0.2)
IMM GRANULOCYTES NFR BLD AUTO: 0 % (ref 0–2)
KETONES UR STRIP-MCNC: NEGATIVE MG/DL
LEUKOCYTE ESTERASE UR QL STRIP: ABNORMAL
LIPASE SERPL-CCNC: 143 U/L (ref 73–393)
LYMPHOCYTES # BLD AUTO: 1.93 THOUSANDS/ΜL (ref 0.6–4.47)
LYMPHOCYTES NFR BLD AUTO: 21 % (ref 14–44)
MCH RBC QN AUTO: 27.7 PG (ref 26.8–34.3)
MCHC RBC AUTO-ENTMCNC: 32 G/DL (ref 31.4–37.4)
MCV RBC AUTO: 87 FL (ref 82–98)
MONOCYTES # BLD AUTO: 0.71 THOUSAND/ΜL (ref 0.17–1.22)
MONOCYTES NFR BLD AUTO: 8 % (ref 4–12)
NEUTROPHILS # BLD AUTO: 6.4 THOUSANDS/ΜL (ref 1.85–7.62)
NEUTS SEG NFR BLD AUTO: 68 % (ref 43–75)
NITRITE UR QL STRIP: NEGATIVE
NON-SQ EPI CELLS URNS QL MICRO: ABNORMAL /HPF
NRBC BLD AUTO-RTO: 0 /100 WBCS
PH UR STRIP.AUTO: 6 [PH] (ref 4.5–8)
PLATELET # BLD AUTO: 338 THOUSANDS/UL (ref 149–390)
PMV BLD AUTO: 8.9 FL (ref 8.9–12.7)
POTASSIUM SERPL-SCNC: 4.3 MMOL/L (ref 3.5–5.3)
PROT SERPL-MCNC: 7.4 G/DL (ref 6.4–8.2)
PROT UR STRIP-MCNC: NEGATIVE MG/DL
RBC # BLD AUTO: 4.52 MILLION/UL (ref 3.81–5.12)
RBC #/AREA URNS AUTO: ABNORMAL /HPF
SODIUM SERPL-SCNC: 142 MMOL/L (ref 136–145)
SP GR UR STRIP.AUTO: 1.02 (ref 1–1.03)
UROBILINOGEN UR QL STRIP.AUTO: 0.2 E.U./DL
WBC # BLD AUTO: 9.3 THOUSAND/UL (ref 4.31–10.16)
WBC #/AREA URNS AUTO: ABNORMAL /HPF

## 2019-08-01 PROCEDURE — 74177 CT ABD & PELVIS W/CONTRAST: CPT

## 2019-08-01 PROCEDURE — 81001 URINALYSIS AUTO W/SCOPE: CPT

## 2019-08-01 PROCEDURE — 76705 ECHO EXAM OF ABDOMEN: CPT

## 2019-08-01 PROCEDURE — 81025 URINE PREGNANCY TEST: CPT | Performed by: PHYSICIAN ASSISTANT

## 2019-08-01 PROCEDURE — 85025 COMPLETE CBC W/AUTO DIFF WBC: CPT | Performed by: PHYSICIAN ASSISTANT

## 2019-08-01 PROCEDURE — 80053 COMPREHEN METABOLIC PANEL: CPT | Performed by: PHYSICIAN ASSISTANT

## 2019-08-01 PROCEDURE — 47562 LAPAROSCOPIC CHOLECYSTECTOMY: CPT | Performed by: SURGERY

## 2019-08-01 PROCEDURE — 96374 THER/PROPH/DIAG INJ IV PUSH: CPT

## 2019-08-01 PROCEDURE — 96365 THER/PROPH/DIAG IV INF INIT: CPT

## 2019-08-01 PROCEDURE — 36415 COLL VENOUS BLD VENIPUNCTURE: CPT | Performed by: PHYSICIAN ASSISTANT

## 2019-08-01 PROCEDURE — 99218 PR INITIAL OBSERVATION CARE/DAY 30 MINUTES: CPT | Performed by: SURGERY

## 2019-08-01 PROCEDURE — 88304 TISSUE EXAM BY PATHOLOGIST: CPT | Performed by: PATHOLOGY

## 2019-08-01 PROCEDURE — 96375 TX/PRO/DX INJ NEW DRUG ADDON: CPT

## 2019-08-01 PROCEDURE — 99284 EMERGENCY DEPT VISIT MOD MDM: CPT | Performed by: PHYSICIAN ASSISTANT

## 2019-08-01 PROCEDURE — 83690 ASSAY OF LIPASE: CPT | Performed by: PHYSICIAN ASSISTANT

## 2019-08-01 RX ORDER — ROCURONIUM BROMIDE 10 MG/ML
INJECTION, SOLUTION INTRAVENOUS AS NEEDED
Status: DISCONTINUED | OUTPATIENT
Start: 2019-08-01 | End: 2019-08-01 | Stop reason: SURG

## 2019-08-01 RX ORDER — ONDANSETRON 2 MG/ML
4 INJECTION INTRAMUSCULAR; INTRAVENOUS ONCE AS NEEDED
Status: DISCONTINUED | OUTPATIENT
Start: 2019-08-01 | End: 2019-08-01 | Stop reason: HOSPADM

## 2019-08-01 RX ORDER — LIDOCAINE HYDROCHLORIDE 10 MG/ML
INJECTION, SOLUTION INFILTRATION; PERINEURAL AS NEEDED
Status: DISCONTINUED | OUTPATIENT
Start: 2019-08-01 | End: 2019-08-01 | Stop reason: SURG

## 2019-08-01 RX ORDER — ONDANSETRON 2 MG/ML
4 INJECTION INTRAMUSCULAR; INTRAVENOUS EVERY 6 HOURS PRN
Status: DISCONTINUED | OUTPATIENT
Start: 2019-08-01 | End: 2019-08-02 | Stop reason: HOSPADM

## 2019-08-01 RX ORDER — BUPIVACAINE HYDROCHLORIDE AND EPINEPHRINE 2.5; 5 MG/ML; UG/ML
INJECTION, SOLUTION EPIDURAL; INFILTRATION; INTRACAUDAL; PERINEURAL AS NEEDED
Status: DISCONTINUED | OUTPATIENT
Start: 2019-08-01 | End: 2019-08-01 | Stop reason: HOSPADM

## 2019-08-01 RX ORDER — CEFAZOLIN SODIUM 2 G/50ML
SOLUTION INTRAVENOUS AS NEEDED
Status: DISCONTINUED | OUTPATIENT
Start: 2019-08-01 | End: 2019-08-01 | Stop reason: SURG

## 2019-08-01 RX ORDER — PROPOFOL 10 MG/ML
INJECTION, EMULSION INTRAVENOUS AS NEEDED
Status: DISCONTINUED | OUTPATIENT
Start: 2019-08-01 | End: 2019-08-01 | Stop reason: SURG

## 2019-08-01 RX ORDER — HYDROMORPHONE HCL/PF 1 MG/ML
0.5 SYRINGE (ML) INJECTION
Status: DISCONTINUED | OUTPATIENT
Start: 2019-08-01 | End: 2019-08-01 | Stop reason: HOSPADM

## 2019-08-01 RX ORDER — GLYCOPYRROLATE 0.2 MG/ML
INJECTION INTRAMUSCULAR; INTRAVENOUS AS NEEDED
Status: DISCONTINUED | OUTPATIENT
Start: 2019-08-01 | End: 2019-08-01 | Stop reason: SURG

## 2019-08-01 RX ORDER — KETOROLAC TROMETHAMINE 30 MG/ML
15 INJECTION, SOLUTION INTRAMUSCULAR; INTRAVENOUS ONCE
Status: COMPLETED | OUTPATIENT
Start: 2019-08-01 | End: 2019-08-01

## 2019-08-01 RX ORDER — FENTANYL CITRATE 50 UG/ML
INJECTION, SOLUTION INTRAMUSCULAR; INTRAVENOUS AS NEEDED
Status: DISCONTINUED | OUTPATIENT
Start: 2019-08-01 | End: 2019-08-01 | Stop reason: SURG

## 2019-08-01 RX ORDER — SODIUM CHLORIDE 9 MG/ML
INJECTION, SOLUTION INTRAVENOUS AS NEEDED
Status: DISCONTINUED | OUTPATIENT
Start: 2019-08-01 | End: 2019-08-01 | Stop reason: HOSPADM

## 2019-08-01 RX ORDER — SODIUM CHLORIDE 9 MG/ML
75 INJECTION, SOLUTION INTRAVENOUS CONTINUOUS
Status: DISCONTINUED | OUTPATIENT
Start: 2019-08-01 | End: 2019-08-01

## 2019-08-01 RX ORDER — PROMETHAZINE HYDROCHLORIDE 25 MG/ML
12.5 INJECTION, SOLUTION INTRAMUSCULAR; INTRAVENOUS ONCE AS NEEDED
Status: DISCONTINUED | OUTPATIENT
Start: 2019-08-01 | End: 2019-08-01 | Stop reason: HOSPADM

## 2019-08-01 RX ORDER — CEFAZOLIN SODIUM 2 G/50ML
2000 SOLUTION INTRAVENOUS ONCE
Status: COMPLETED | OUTPATIENT
Start: 2019-08-01 | End: 2019-08-01

## 2019-08-01 RX ORDER — ACETAMINOPHEN 325 MG/1
650 TABLET ORAL EVERY 6 HOURS PRN
Status: DISCONTINUED | OUTPATIENT
Start: 2019-08-01 | End: 2019-08-02 | Stop reason: HOSPADM

## 2019-08-01 RX ORDER — SODIUM CHLORIDE, SODIUM LACTATE, POTASSIUM CHLORIDE, CALCIUM CHLORIDE 600; 310; 30; 20 MG/100ML; MG/100ML; MG/100ML; MG/100ML
75 INJECTION, SOLUTION INTRAVENOUS CONTINUOUS
Status: DISCONTINUED | OUTPATIENT
Start: 2019-08-01 | End: 2019-08-02

## 2019-08-01 RX ORDER — HYDROMORPHONE HCL/PF 1 MG/ML
0.2 SYRINGE (ML) INJECTION
Status: DISCONTINUED | OUTPATIENT
Start: 2019-08-01 | End: 2019-08-02 | Stop reason: HOSPADM

## 2019-08-01 RX ORDER — HYDROMORPHONE HCL/PF 1 MG/ML
0.2 SYRINGE (ML) INJECTION
Status: DISCONTINUED | OUTPATIENT
Start: 2019-08-01 | End: 2019-08-01

## 2019-08-01 RX ORDER — HYDROCODONE BITARTRATE AND ACETAMINOPHEN 5; 325 MG/1; MG/1
1 TABLET ORAL EVERY 4 HOURS PRN
Status: DISCONTINUED | OUTPATIENT
Start: 2019-08-01 | End: 2019-08-02 | Stop reason: HOSPADM

## 2019-08-01 RX ORDER — DEXAMETHASONE SODIUM PHOSPHATE 10 MG/ML
INJECTION, SOLUTION INTRAMUSCULAR; INTRAVENOUS AS NEEDED
Status: DISCONTINUED | OUTPATIENT
Start: 2019-08-01 | End: 2019-08-01 | Stop reason: SURG

## 2019-08-01 RX ORDER — MEPERIDINE HYDROCHLORIDE 25 MG/ML
12.5 INJECTION INTRAMUSCULAR; INTRAVENOUS; SUBCUTANEOUS AS NEEDED
Status: DISCONTINUED | OUTPATIENT
Start: 2019-08-01 | End: 2019-08-01 | Stop reason: HOSPADM

## 2019-08-01 RX ORDER — ALBUTEROL SULFATE 2.5 MG/3ML
2.5 SOLUTION RESPIRATORY (INHALATION) ONCE AS NEEDED
Status: DISCONTINUED | OUTPATIENT
Start: 2019-08-01 | End: 2019-08-01 | Stop reason: HOSPADM

## 2019-08-01 RX ORDER — NEOSTIGMINE METHYLSULFATE 1 MG/ML
INJECTION INTRAVENOUS AS NEEDED
Status: DISCONTINUED | OUTPATIENT
Start: 2019-08-01 | End: 2019-08-01 | Stop reason: SURG

## 2019-08-01 RX ORDER — HYDROMORPHONE HCL/PF 1 MG/ML
0.2 SYRINGE (ML) INJECTION ONCE
Status: COMPLETED | OUTPATIENT
Start: 2019-08-01 | End: 2019-08-01

## 2019-08-01 RX ORDER — SODIUM CHLORIDE, SODIUM LACTATE, POTASSIUM CHLORIDE, CALCIUM CHLORIDE 600; 310; 30; 20 MG/100ML; MG/100ML; MG/100ML; MG/100ML
INJECTION, SOLUTION INTRAVENOUS CONTINUOUS PRN
Status: DISCONTINUED | OUTPATIENT
Start: 2019-08-01 | End: 2019-08-01 | Stop reason: SURG

## 2019-08-01 RX ORDER — ONDANSETRON 2 MG/ML
4 INJECTION INTRAMUSCULAR; INTRAVENOUS ONCE
Status: COMPLETED | OUTPATIENT
Start: 2019-08-01 | End: 2019-08-01

## 2019-08-01 RX ORDER — HYDROMORPHONE HCL/PF 1 MG/ML
0.5 SYRINGE (ML) INJECTION
Status: DISCONTINUED | OUTPATIENT
Start: 2019-08-01 | End: 2019-08-01

## 2019-08-01 RX ORDER — FENTANYL CITRATE/PF 50 MCG/ML
25 SYRINGE (ML) INJECTION
Status: COMPLETED | OUTPATIENT
Start: 2019-08-01 | End: 2019-08-01

## 2019-08-01 RX ORDER — MIDAZOLAM HYDROCHLORIDE 1 MG/ML
INJECTION INTRAMUSCULAR; INTRAVENOUS AS NEEDED
Status: DISCONTINUED | OUTPATIENT
Start: 2019-08-01 | End: 2019-08-01 | Stop reason: SURG

## 2019-08-01 RX ORDER — ONDANSETRON 2 MG/ML
INJECTION INTRAMUSCULAR; INTRAVENOUS AS NEEDED
Status: DISCONTINUED | OUTPATIENT
Start: 2019-08-01 | End: 2019-08-01 | Stop reason: SURG

## 2019-08-01 RX ORDER — LABETALOL 20 MG/4 ML (5 MG/ML) INTRAVENOUS SYRINGE
5
Status: DISCONTINUED | OUTPATIENT
Start: 2019-08-01 | End: 2019-08-01 | Stop reason: HOSPADM

## 2019-08-01 RX ORDER — HYDROMORPHONE HCL/PF 1 MG/ML
SYRINGE (ML) INJECTION AS NEEDED
Status: DISCONTINUED | OUTPATIENT
Start: 2019-08-01 | End: 2019-08-01 | Stop reason: SURG

## 2019-08-01 RX ORDER — HYDROMORPHONE HCL/PF 1 MG/ML
0.5 SYRINGE (ML) INJECTION EVERY 4 HOURS PRN
Status: DISCONTINUED | OUTPATIENT
Start: 2019-08-01 | End: 2019-08-02 | Stop reason: HOSPADM

## 2019-08-01 RX ADMIN — FENTANYL CITRATE 50 MCG: 50 INJECTION INTRAMUSCULAR; INTRAVENOUS at 14:19

## 2019-08-01 RX ADMIN — METRONIDAZOLE 500 MG: 500 INJECTION, SOLUTION INTRAVENOUS at 04:16

## 2019-08-01 RX ADMIN — ONDANSETRON 4 MG: 2 INJECTION INTRAMUSCULAR; INTRAVENOUS at 00:44

## 2019-08-01 RX ADMIN — SODIUM CHLORIDE, SODIUM LACTATE, POTASSIUM CHLORIDE, AND CALCIUM CHLORIDE 125 ML/HR: .6; .31; .03; .02 INJECTION, SOLUTION INTRAVENOUS at 16:45

## 2019-08-01 RX ADMIN — PROPOFOL 200 MG: 10 INJECTION, EMULSION INTRAVENOUS at 14:19

## 2019-08-01 RX ADMIN — ONDANSETRON 4 MG: 2 INJECTION INTRAMUSCULAR; INTRAVENOUS at 15:08

## 2019-08-01 RX ADMIN — MIDAZOLAM HYDROCHLORIDE 2 MG: 1 INJECTION, SOLUTION INTRAMUSCULAR; INTRAVENOUS at 14:17

## 2019-08-01 RX ADMIN — HYDROMORPHONE HYDROCHLORIDE 0.5 MG: 1 INJECTION, SOLUTION INTRAMUSCULAR; INTRAVENOUS; SUBCUTANEOUS at 15:09

## 2019-08-01 RX ADMIN — DEXAMETHASONE SODIUM PHOSPHATE 4 MG: 10 INJECTION, SOLUTION INTRAMUSCULAR; INTRAVENOUS at 14:19

## 2019-08-01 RX ADMIN — LIDOCAINE HYDROCHLORIDE ANHYDROUS 30 MG: 10 INJECTION, SOLUTION INFILTRATION at 14:19

## 2019-08-01 RX ADMIN — IOHEXOL 100 ML: 350 INJECTION, SOLUTION INTRAVENOUS at 01:37

## 2019-08-01 RX ADMIN — GLYCOPYRROLATE 0.2 MG: 0.2 INJECTION, SOLUTION INTRAMUSCULAR; INTRAVENOUS at 14:23

## 2019-08-01 RX ADMIN — HYDROMORPHONE HYDROCHLORIDE 0.5 MG: 1 INJECTION, SOLUTION INTRAMUSCULAR; INTRAVENOUS; SUBCUTANEOUS at 14:45

## 2019-08-01 RX ADMIN — ONDANSETRON 4 MG: 2 INJECTION INTRAMUSCULAR; INTRAVENOUS at 22:08

## 2019-08-01 RX ADMIN — KETOROLAC TROMETHAMINE 15 MG: 30 INJECTION, SOLUTION INTRAMUSCULAR at 04:15

## 2019-08-01 RX ADMIN — CEFAZOLIN SODIUM 2000 MG: 2 SOLUTION INTRAVENOUS at 14:14

## 2019-08-01 RX ADMIN — SODIUM CHLORIDE, SODIUM LACTATE, POTASSIUM CHLORIDE, AND CALCIUM CHLORIDE: .6; .31; .03; .02 INJECTION, SOLUTION INTRAVENOUS at 14:17

## 2019-08-01 RX ADMIN — FENTANYL CITRATE 25 MCG: 50 INJECTION, SOLUTION INTRAMUSCULAR; INTRAVENOUS at 15:42

## 2019-08-01 RX ADMIN — SODIUM CHLORIDE 75 ML/HR: 0.9 INJECTION, SOLUTION INTRAVENOUS at 05:20

## 2019-08-01 RX ADMIN — FENTANYL CITRATE 25 MCG: 50 INJECTION, SOLUTION INTRAMUSCULAR; INTRAVENOUS at 15:57

## 2019-08-01 RX ADMIN — ONDANSETRON 4 MG: 2 INJECTION INTRAMUSCULAR; INTRAVENOUS at 09:05

## 2019-08-01 RX ADMIN — FENTANYL CITRATE 25 MCG: 50 INJECTION, SOLUTION INTRAMUSCULAR; INTRAVENOUS at 15:39

## 2019-08-01 RX ADMIN — HYDROMORPHONE HYDROCHLORIDE 0.2 MG: 1 INJECTION, SOLUTION INTRAMUSCULAR; INTRAVENOUS; SUBCUTANEOUS at 19:00

## 2019-08-01 RX ADMIN — FENTANYL CITRATE 25 MCG: 50 INJECTION, SOLUTION INTRAMUSCULAR; INTRAVENOUS at 15:48

## 2019-08-01 RX ADMIN — HYDROMORPHONE HYDROCHLORIDE 0.5 MG: 1 INJECTION, SOLUTION INTRAMUSCULAR; INTRAVENOUS; SUBCUTANEOUS at 20:17

## 2019-08-01 RX ADMIN — CEFAZOLIN SODIUM 2000 MG: 2 SOLUTION INTRAVENOUS at 05:10

## 2019-08-01 RX ADMIN — GLYCOPYRROLATE 0.4 MG: 0.2 INJECTION, SOLUTION INTRAMUSCULAR; INTRAVENOUS at 15:08

## 2019-08-01 RX ADMIN — ROCURONIUM BROMIDE 10 MG: 10 INJECTION INTRAVENOUS at 14:46

## 2019-08-01 RX ADMIN — ROCURONIUM BROMIDE 40 MG: 10 INJECTION INTRAVENOUS at 14:19

## 2019-08-01 RX ADMIN — NEOSTIGMINE METHYLSULFATE 3 MG: 1 INJECTION INTRAVENOUS at 15:08

## 2019-08-01 RX ADMIN — KETOROLAC TROMETHAMINE 15 MG: 30 INJECTION, SOLUTION INTRAMUSCULAR at 00:43

## 2019-08-01 RX ADMIN — HYDROCODONE BITARTRATE AND ACETAMINOPHEN 1 TABLET: 5; 325 TABLET ORAL at 16:41

## 2019-08-01 RX ADMIN — HYDROMORPHONE HYDROCHLORIDE 0.5 MG: 1 INJECTION, SOLUTION INTRAMUSCULAR; INTRAVENOUS; SUBCUTANEOUS at 09:05

## 2019-08-01 NOTE — H&P
H&P Exam - General Surgery   Gini Speaker 34 y o  female MRN: [id number]  Unit/Bed#: -01 Encounter: [id number]    Assessment/Plan     Assessment:  62-year-old female with no past medical history presents with right upper quadrant pain with radiation to her back x1 day  Plan:  1  Cholelithiasis  -V  S S, afebrile, without nausea or vomiting  -NPO/IV fluids  -Await right upper quadrant ultrasound results  -Encourage out of bed and ambulation in the hallways  -Pain control and antiemetic regimen p r n  History of Present Illness   HPI:  Gini Speaker is a 34 y o  female with no past medical history who presents with right upper quadrant abdominal pain with radiation to her back x1 day  Patient states she has had previous episodes of these attacks where she presented to the ED was told she had gallstones  She admitted to associated nausea, however without emesis  She tried taking Motrin at home however without relief  She denies any fever, chills, chest pain, shortness of breath, change in bowel or bladder habits  Patient denies any previous surgical history  She admits to smoking a quarter of a pack of cigarettes a day  She denies any current alcohol use  CT scan with no evidence of calcified gallstones or pericholecystic inflammatory changes  Review of Systems   Constitutional: Positive for activity change (Decreased secondary to pain)  Negative for appetite change, chills and fever  HENT: Negative for congestion and sore throat  Eyes: Negative for pain  Respiratory: Negative for cough, chest tightness, shortness of breath and wheezing  Cardiovascular: Negative for chest pain and palpitations  Gastrointestinal: Positive for abdominal pain (Right upper quadrant, radiates to back) and nausea  Negative for abdominal distention, blood in stool, constipation, diarrhea and vomiting  Genitourinary: Negative for difficulty urinating, dysuria, flank pain, hematuria and urgency  Musculoskeletal: Positive for back pain (Radiates from right upper quadrant)  Negative for neck pain  Skin: Negative for color change, pallor and rash  Neurological: Negative for dizziness, weakness, light-headedness, numbness and headaches  Psychiatric/Behavioral: Negative for agitation and behavioral problems  Historical Information   History reviewed  No pertinent past medical history  History reviewed  No pertinent surgical history  Social History   Social History     Substance and Sexual Activity   Alcohol Use No     Social History     Substance and Sexual Activity   Drug Use No     Social History     Tobacco Use   Smoking Status Current Every Day Smoker    Packs/day: 0 20    Types: Cigarettes    Last attempt to quit: 2018    Years since quittin 9   Smokeless Tobacco Never Used     Family History: History reviewed  No pertinent family history      Meds/Allergies   PTA meds:   None     No Known Allergies    Objective   First Vitals:   Blood Pressure: 140/83 (19)  Pulse: 74 (19)  Temperature: 98 2 °F (36 8 °C) (19)  Temp Source: Oral (19)  Respirations: 18 (19)  Height: 5' 5" (165 1 cm) (19)  Weight - Scale: 102 kg (225 lb) (19)  SpO2: 100 % (19)    Current Vitals:   Blood Pressure: (!) 92/49 (19)  Pulse: 77 (19)  Temperature: 98 3 °F (36 8 °C) (19)  Temp Source: Oral (19)  Respirations: 18 (19)  Height: 5' 5" (165 1 cm) (19)  Weight - Scale: 102 kg (225 lb) (19)  SpO2: 100 % (19)      Intake/Output Summary (Last 24 hours) at 2019 0823  Last data filed at 2019 0520  Gross per 24 hour   Intake 975 ml   Output --   Net 975 ml       Invasive Devices     Peripheral Intravenous Line            Peripheral IV 19 Left Arm less than 1 day                Physical Exam   General: Pt is AAOx3, lying down in bed in NAD  HEENT: normocephalic, no scleral icterus,  moist mucous membranes   Neck: Supple, non-tender, no JVD, ROM intact, no tracheal deviation   CV: RRR, no murmurs, gallops, rubs  S1 and S2  Resp: Lung sounds clear to auscultation B/L, normal respiratory effort no  wheezes, rhonchi, rhales   Abd: Soft, obese, mild tenderness of RUQ upon palpation, non-distended, non-tympanitic  Normoactive bowelsounds all 4 quadrants  + guarding  Tenses up prior to palpation  +Rapp's sign  No rebound  No CVA tenderness  Ext: Warm with no cyanosis, no edema, no deformities  ROM intact   Skin: No rashes, bruises, ulcers  Neuro: Sensation intact all 4 extremities  5+ strength all 4 extremities  Lab Results:   CBC:   Lab Results   Component Value Date    WBC 9 30 08/01/2019    HGB 12 5 08/01/2019    HCT 39 1 08/01/2019    MCV 87 08/01/2019     08/01/2019    MCH 27 7 08/01/2019    MCHC 32 0 08/01/2019    RDW 13 7 08/01/2019    MPV 8 9 08/01/2019    NRBC 0 08/01/2019   , CMP:   Lab Results   Component Value Date    SODIUM 142 08/01/2019    K 4 3 08/01/2019     08/01/2019    CO2 28 08/01/2019    BUN 11 08/01/2019    CREATININE 0 71 08/01/2019    CALCIUM 9 0 08/01/2019    AST 20 08/01/2019    ALT 25 08/01/2019    ALKPHOS 99 08/01/2019    EGFR 117 08/01/2019   , Coagulation: No results found for: PT, INR, APTT, Urinalysis:   Lab Results   Component Value Date    COLORU Yellow 08/01/2019    CLARITYU Clear 08/01/2019    SPECGRAV 1 025 08/01/2019    PHUR 6 0 08/01/2019    LEUKOCYTESUR Trace (A) 08/01/2019    NITRITE Negative 08/01/2019    GLUCOSEU Negative 08/01/2019    KETONESU Negative 08/01/2019    BILIRUBINUR Negative 08/01/2019    BLOODU Negative 08/01/2019   , Amylase: No results found for: AMYLASE, Lipase:   Lab Results   Component Value Date    LIPASE 143 08/01/2019     Imaging: I have personally reviewed pertinent reports  EKG, Pathology, and Other Studies: I have personally reviewed pertinent reports  Code Status: No Order  Advance Directive and Living Will:      Power of :    POLST:      Counseling / Coordination of Care  Total floor / unit time spent today 35 minutes  Greater than 50% of total time was spent with the patient and / or family counseling and / or coordination of care  A description of the counseling / coordination of care      Patricia Adrian PA-C

## 2019-08-01 NOTE — OP NOTE
OPERATIVE REPORT  PATIENT NAME: Gini Richards    :  1990  MRN: [id number]  Pt Location: AN OR ROOM 02    SURGERY DATE: 2019    Surgeon(s) and Role:     * Mckinley Torrez MD - Primary     * Jyoti Goldstein MD - Assisting    Preop Diagnosis:  acute calculous cholecystitis    Post-Op Diagnosis Codes:     * acute calculous cholecystitis    Procedure(s) (LRB):  CHOLECYSTECTOMY LAPAROSCOPIC (N/A)    Specimen(s):  ID Type Source Tests Collected by Time Destination   1 :  Tissue Gallbladder TISSUE EXAM Mckinley Torrez MD 2019 1421        Estimated Blood Loss:   Minimal    Drains:  * No LDAs found *    Anesthesia Type:   General    Operative Indications:  Cholelithiasis [K80 20]      Operative Findings:      Complications:   None    Procedure and Technique:  Gini Richards is a 34 y o  female was brought into the operative suite and identified visually and by arm band  The patient was placed in the supine position  After sterile prep and drape a timeout was completed  Antibiotics were provided  Athrombic pumps in place  An incision was made at the umbilicus after instillation of local analgesia  With blunt dissection the peritoneum of the peritoneum was identified  A trocar was then inserted  CO2 was then insufflated with a back pressure of 15  The direct vision additional trochars are placed in the upper aspect of the abdomen  Laparoscopic visualization revealed a normal liver and normal stomach no excess peritoneal fluid  The gallbladder was distended and inflamed consistent with acute calculous cholecystitis  The gallbladder was pulled on lateral traction and a dome down technique was completed with electrocautery  This was carried down to the level of Allen's pouch  The cystic duct was then dissected free  Careful attention was made towards the critical anatomy at this region  The cystic duct was identified inserting into the base of the gallbladder  It was then clipped ×3 and divided   The cystic artery was clipped ×2 and divided  The gallbladder was then removed from the liver bed  The area was copiously irrigated  Hemostasis was assured  The gallbladder was then removed through the umbilical incision  Fascia was closed with 0 Vicryl suture  Subcuticular incisions were then closed  Histacryl was then applied  Patient was awakened from general anesthesia and transferred to the recovery room in stable condition  Sponge and instrument count correct ×2       I was present for the entire procedure    Patient Disposition:  PACU     SIGNATURE: Eloisa Shah MD  DATE: August 1, 2019  TIME: 3:37 PM

## 2019-08-01 NOTE — PLAN OF CARE
Problem: PAIN - ADULT  Goal: Verbalizes/displays adequate comfort level or baseline comfort level  Description  Interventions:  - Encourage patient to monitor pain and request assistance  - Assess pain using appropriate pain scale  - Administer analgesics based on type and severity of pain and evaluate response  - Implement non-pharmacological measures as appropriate and evaluate response  - Consider cultural and social influences on pain and pain management  - Notify physician/advanced practitioner if interventions unsuccessful or patient reports new pain  Outcome: Progressing     Problem: INFECTION - ADULT  Goal: Absence or prevention of progression during hospitalization  Description  INTERVENTIONS:  - Assess and monitor for signs and symptoms of infection  - Monitor lab/diagnostic results  - Monitor all insertion sites, i e  indwelling lines, tubes, and drains  - Monitor endotracheal (as able) and nasal secretions for changes in amount and color  - [place] appropriate cooling/warming therapies per order  - Administer medications as ordered  - Instruct and encourage patient and family to use good hand hygiene technique  - Identify and instruct in appropriate isolation precautions for identified infection/condition  Outcome: Progressing  Goal: Absence of fever/infection during neutropenic period  Description  INTERVENTIONS:  - Monitor WBC  - Implement neutropenic guidelines  Outcome: Progressing     Problem: SAFETY ADULT  Goal: Patient will remain free of falls  Description  INTERVENTIONS:  - Assess patient frequently for physical needs  -  Identify cognitive and physical deficits and behaviors that affect risk of falls    -  [place] fall precautions as indicated by assessment   - Educate patient/family on patient safety including physical limitations  - Instruct patient to call for assistance with activity based on assessment  - Modify environment to reduce risk of injury  - Consider OT/PT consult to assist with strengthening/mobility  Outcome: Progressing  Goal: Maintain or return to baseline ADL function  Description  INTERVENTIONS:  -  Assess patient's ability to carry out ADLs; assess patient's baseline for ADL function and identify physical deficits which impact ability to perform ADLs (bathing, care of mouth/teeth, toileting, grooming, dressing, etc )  - Assess/evaluate cause of self-care deficits   - Assess range of motion  - Assess patient's mobility; develop plan if impaired  - Assess patient's need for assistive devices and provide as appropriate  - Encourage maximum independence but intervene and supervise when necessary  ¯ Involve family in performance of ADLs  ¯ Assess for home care needs following discharge   ¯ Request OT consult to assist with ADL evaluation and planning for discharge  ¯ Provide patient education as appropriate  Outcome: Progressing  Goal: Maintain or return mobility status to optimal level  Description  INTERVENTIONS:  - Assess patient's baseline mobility status (ambulation, transfers, stairs, etc )    - Identify cognitive and physical deficits and behaviors that affect mobility  - Identify mobility aids required to assist with transfers and/or ambulation (gait belt, sit-to-stand, lift, walker, cane, etc )  - [place] fall precautions as indicated by assessment  - Record patient progress and toleration of activity level on Mobility SBAR; progress patient to next Phase/Stage  - Instruct patient to call for assistance with activity based on assessment  - Request Rehabilitation consult to assist with strengthening/weightbearing, etc   Outcome: Progressing

## 2019-08-01 NOTE — PLAN OF CARE
Problem: PAIN - ADULT  Goal: Verbalizes/displays adequate comfort level or baseline comfort level  Description  Interventions:  - Encourage patient to monitor pain and request assistance  - Assess pain using appropriate pain scale  - Administer analgesics based on type and severity of pain and evaluate response  - Implement non-pharmacological measures as appropriate and evaluate response  - Consider cultural and social influences on pain and pain management  - Notify physician/advanced practitioner if interventions unsuccessful or patient reports new pain  Outcome: Progressing     Problem: INFECTION - ADULT  Goal: Absence or prevention of progression during hospitalization  Description  INTERVENTIONS:  - Assess and monitor for signs and symptoms of infection  - Monitor lab/diagnostic results  - Monitor all insertion sites, i e  indwelling lines, tubes, and drains  - Monitor endotracheal (as able) and nasal secretions for changes in amount and color  - [place] appropriate cooling/warming therapies per order  - Administer medications as ordered  - Instruct and encourage patient and family to use good hand hygiene technique  - Identify and instruct in appropriate isolation precautions for identified infection/condition  Outcome: Progressing  Goal: Absence of fever/infection during neutropenic period  Description  INTERVENTIONS:  - Monitor WBC  - Implement neutropenic guidelines  Outcome: Progressing     Problem: SAFETY ADULT  Goal: Patient will remain free of falls  Description  INTERVENTIONS:  - Assess patient frequently for physical needs  -  Identify cognitive and physical deficits and behaviors that affect risk of falls    -  [place] fall precautions as indicated by assessment   - Educate patient/family on patient safety including physical limitations  - Instruct patient to call for assistance with activity based on assessment  - Modify environment to reduce risk of injury  - Consider OT/PT consult to assist with strengthening/mobility  Outcome: Progressing  Goal: Maintain or return to baseline ADL function  Description  INTERVENTIONS:  -  Assess patient's ability to carry out ADLs; assess patient's baseline for ADL function and identify physical deficits which impact ability to perform ADLs (bathing, care of mouth/teeth, toileting, grooming, dressing, etc )  - Assess/evaluate cause of self-care deficits   - Assess range of motion  - Assess patient's mobility; develop plan if impaired  - Assess patient's need for assistive devices and provide as appropriate  - Encourage maximum independence but intervene and supervise when necessary  ¯ Involve family in performance of ADLs  ¯ Assess for home care needs following discharge   ¯ Request OT consult to assist with ADL evaluation and planning for discharge  ¯ Provide patient education as appropriate  Outcome: Progressing  Goal: Maintain or return mobility status to optimal level  Description  INTERVENTIONS:  - Assess patient's baseline mobility status (ambulation, transfers, stairs, etc )    - Identify cognitive and physical deficits and behaviors that affect mobility  - Identify mobility aids required to assist with transfers and/or ambulation (gait belt, sit-to-stand, lift, walker, cane, etc )  - [place] fall precautions as indicated by assessment  - Record patient progress and toleration of activity level on Mobility SBAR; progress patient to next Phase/Stage  - Instruct patient to call for assistance with activity based on assessment  - Request Rehabilitation consult to assist with strengthening/weightbearing, etc   Outcome: Progressing

## 2019-08-01 NOTE — DISCHARGE INSTRUCTIONS
Post-Operative Care Instructions      1  General: You may feel pulling sensations around the wound or funny aches and pains around the incisions  This is normal  Even minor surgery is a change in your body and this is your body's reaction to it  If you have had abdominal surgery, it may help to support the incision with a small pillow or blanket for comfort when moving or coughing  2  Wound care: The glue over the incisions will fall off over the next week or two  If you have staples or stitches, they will be removed by the physician at your follow up appointment  3  Showering: You may shower  Do not soak wound in a bath, hot tub, pool, lake, etc  Do not scrub or use exfoliants on the surgical wounds  4  Activity: You may go up and down stairs, walk as much as you are comfortable, but walk at least 3 times each day  If you have had abdominal surgery, do not perform any strenuous exercise or lift anything heavier than 10-15 pounds for at least 3 weeks, unless cleared by your physician  5  Diet: You may resume your regular diet  6  Medications: Resume all of your previous medications, unless told otherwise by the doctor  A good option for pain control is to start with acetaminophen(Tylenol) 650mg and ibuprofen(Advil) 600mg and alternate taking them every 3 hours  If this is not sufficient then you make take the narcotic pain medicine as prescribed  You do not need to take the narcotic pain medication unless you are having significant pain and discomfort  Insure that you do not take more than 4000 mg of Tylenol per day  7  Driving: You will need someone to drive you home on the day of surgery  Do not drive or make any important decisions while on narcotic pain medication or for up to 24 hours after anesthesia for surgery  Generally, you may drive when you're off all narcotic pain medications  8  Upset Stomach: You may take Maalox, Tums, or similar items for an upset stomach   If your narcotic pain medication causes an upset stomach, do not take it on an empty stomach  Try taking it with at least some crackers or toast      9  Constipation: Patients often experienced constipation after surgery  You may take over-the-counter medication for this, such as Metamucil, Senokot, Colace, milk of magnesia, etc  If you experience significant nausea or vomiting after abdominal surgery, call the office before trying any of these medications  10  Call the office: If you are experiencing any of the following: fevers above 101 5°, significant nausea or vomiting, if the wound develops drainage and/or excessive redness around the wound, or if you have significant diarrhea or other worsening symptoms  11  Pain: You may be given a prescription for pain medication  This should be given to you upon discharge from the hospital       Please call [phone] for a follow-up office visit for 2 weeks after surgery  [address] [facility], [address], [place], [id number]    [address] between [place] and [employer]

## 2019-08-01 NOTE — ANESTHESIA PREPROCEDURE EVALUATION
Review of Systems/Medical History  Patient summary reviewed        Cardiovascular  Negative cardio ROS Exercise tolerance (METS): >4,     Pulmonary  Asthma ,   Comment: Asthma as a child     GI/Hepatic  Negative GI/hepatic ROS          Negative  ROS        Endo/Other  Negative endo/other ROS   Obesity  morbid obesity   GYN  Negative gynecology ROS     Comment: preg test negative     Hematology      Comment: Mother and brother have tested positive for pseudocholinesterase deficiency  Pt has not been tested yet  Musculoskeletal  Negative musculoskeletal ROS        Neurology  Negative neurology ROS      Psychology   Negative psychology ROS              Physical Exam    Airway    Mallampati score: II  TM Distance: >3 FB  Neck ROM: full     Dental   No notable dental hx     Cardiovascular  Comment: Negative ROS, Cardiovascular exam normal    Pulmonary      Other Findings        Anesthesia Plan  ASA Score- 3     Anesthesia Type- general with ASA Monitors  Additional Monitors:   Airway Plan: ETT  Comment: Patient seen and examined  History reviewed  Patient to be done under general anesthesia with ETT and routine monitors  Risks discussed with the patient  Consent obtained        Plan Factors-    Induction- intravenous  Postoperative Plan-     Informed Consent- Anesthetic plan and risks discussed with patient  I personally reviewed this patient with the CRNA  Discussed and agreed on the Anesthesia Plan with the CRNA  Tony Moralez

## 2019-08-01 NOTE — ANESTHESIA POSTPROCEDURE EVALUATION
Post-Op Assessment Note    CV Status:  Stable  Pain Score: 0    Pain management: adequate     Mental Status:  Alert and awake   Hydration Status:  Euvolemic   PONV Controlled:  Controlled   Airway Patency:  Patent and adequate   Post Op Vitals Reviewed: Yes      Staff: CRNA            59   Temp 98 8   Pulse  55   Resp   20   SpO2   100% on 4 L O2 via NC

## 2019-08-01 NOTE — UTILIZATION REVIEW
Initial Clinical Review    Admission: Date/Time/Statement: 8/1/2019  0405 OUTPATIENT NO CHARGE BED  08/01/19 0405  Outpatient No Charge Bed (ED Bridging Orders Panel) Once     Transfer Service: Hospitalist       Question: Admitting Physician Answer: Eric Isaac    08/01/19 0405       ED Arrival Information     Expected Arrival Acuity Means of Arrival Escorted By Service Admission Type    - 7/31/2019 23:52 Urgent Walk-In Self Hospitalist Urgent    Arrival Complaint    Abd pain        Chief Complaint   Patient presents with    Abdominal Pain Re-Eval     Presents for re-eval of RUQ ABD pain  Seen/evaluated at this facility approx 1wk prior and dx w/ gallstones  Motrin ATC at home non-effective to manage pain  Denies fevers, n/v       Assessment/Plan: This is a  34year old female from  home to ED  Admitted to outpatient no charge bed due to cholelithiasis  Presented with RUQ pain radiating to back for last day with associated nausea  On exam with tenderness of RUQ upon palpation with guarding   + alvarez sign  Ct abdomen without acute findings  Pain control, IVF in progress  For US later today  7/31- US showed cholelithiasis with gallstone in neck gallbladder       Procedure done - CHOLECYSTECTOMY LAPAROSCOPIC    Post operatively, patient remained in outpatient no charge bed for pain control      ED Triage Vitals [08/01/19 0008]   Temperature Pulse Respirations Blood Pressure SpO2   98 2 °F (36 8 °C) 74 18 140/83 100 %      Temp Source Heart Rate Source Patient Position - Orthostatic VS BP Location FiO2 (%)   Oral Monitor Sitting Right arm --      Pain Score       8        Wt Readings from Last 1 Encounters:   08/01/19 102 kg (225 lb)     Additional Vital Signs:   08/01/19 0716  98 3 °F (36 8 °C)  77  18  92/49Abnormal   100 %  None (Room air)  Lying   08/01/19 0539  98 4 °F (36 9 °C)  69  18  108/57  100 %  None (Room air)         Pertinent Labs/Diagnostic Test Results:   8/1/2019 CT abdomen - Small right-sided corpus luteal cyst    8/1/2019  US gallbladder - Cholelithiasis including punctate nonmobile gallstone in the neck of the gallbladder   No sonographic evidence of acute cholecystitis   Follow-up with HIDA scan if clinically warranted    Remainder of the examination is normal  Results from last 7 days   Lab Units 08/01/19  0043   WBC Thousand/uL 9 30   HEMOGLOBIN g/dL 12 5   HEMATOCRIT % 39 1   PLATELETS Thousands/uL 338   NEUTROS ABS Thousands/µL 6 40     Results from last 7 days   Lab Units 08/01/19  0043   SODIUM mmol/L 142   POTASSIUM mmol/L 4 3   CHLORIDE mmol/L 105   CO2 mmol/L 28   ANION GAP mmol/L 9   BUN mg/dL 11   CREATININE mg/dL 0 71   EGFR ml/min/1 73sq m 117   CALCIUM mg/dL 9 0     Results from last 7 days   Lab Units 08/01/19  0043   AST U/L 20   ALT U/L 25   ALK PHOS U/L 99   TOTAL PROTEIN g/dL 7 4   ALBUMIN g/dL 3 6   TOTAL BILIRUBIN mg/dL 0 10*     Results from last 7 days   Lab Units 08/01/19  0043   GLUCOSE RANDOM mg/dL 101     Results from last 7 days   Lab Units 08/01/19  0043   LIPASE u/L 143     Results from last 7 days   Lab Units 08/01/19  0038   CLARITY UA  Clear   COLOR UA  Yellow   SPEC GRAV UA  1 025   PH UA  6 0   GLUCOSE UA mg/dl Negative   KETONES UA mg/dl Negative   BLOOD UA  Negative   PROTEIN UA mg/dl Negative   NITRITE UA  Negative   BILIRUBIN UA  Negative   UROBILINOGEN UA E U /dl 0 2   LEUKOCYTES UA  Trace*   WBC UA /hpf 1-2*   RBC UA /hpf 0-1*   BACTERIA UA /hpf Occasional   EPITHELIAL CELLS WET PREP /hpf Occasional       ED Treatment:   Medication Administration from 07/31/2019 4562 to 08/01/2019 0441       Date/Time Order Dose Route Action Comments     08/01/2019 0043 ketorolac (TORADOL) injection 15 mg 15 mg Intravenous Given      08/01/2019 0044 ondansetron (ZOFRAN) injection 4 mg 4 mg Intravenous Given      08/01/2019 0415 ketorolac (TORADOL) injection 15 mg 15 mg Intravenous Given      08/01/2019 0416 metroNIDAZOLE (FLAGYL) IVPB (premix) 500 mg 500 mg Intravenous New Bag         History reviewed  No pertinent past medical history  Present on Admission:   Calculus of gallbladder without cholecystitis without obstruction      Admitting Diagnosis: Cholelithiasis [V02 50]  Biliary colic [N96 77]  Abdominal pain [R10 9]  Age/Sex: 34 y o  female  Admission Orders: 8/1/2019  0406 OUTPATIENT NO CHARGE BED     Current Facility-Administered Medications:  acetaminophen 650 mg Oral Q6H PRN    HYDROcodone-acetaminophen - used x 1 1 tablet Oral Q4H PRN    HYDROmorphone - used x 1  Post operatively - used x 3 (2017, 0022, 0447) 0 5 mg Intravenous Q3H PRN 0905   Ondansetron - used x 1 4 mg Intravenous Q6H PRN 0905   sodium chloride 75 mL/hr Intravenous Continuous Last Rate: 75 mL/hr (08/01/19 0520)     [address] [facility] Utilization Review Department  Phone: [phone]; Fax [phone]  [email or web address]  ATTENTION: Please call with any questions or concerns to [phone]  and carefully listen to the prompts so that you are directed to the right person  Send all requests for admission clinical reviews, approved or denied determinations and any other requests to fax [phone]   All voicemails are confidential

## 2019-08-02 VITALS
SYSTOLIC BLOOD PRESSURE: 122 MMHG | HEIGHT: 65 IN | TEMPERATURE: 98.8 F | DIASTOLIC BLOOD PRESSURE: 57 MMHG | OXYGEN SATURATION: 100 % | BODY MASS INDEX: 37.49 KG/M2 | RESPIRATION RATE: 16 BRPM | HEART RATE: 67 BPM | WEIGHT: 225 LBS

## 2019-08-02 RX ORDER — HYDROCODONE BITARTRATE AND ACETAMINOPHEN 5; 325 MG/1; MG/1
1 TABLET ORAL EVERY 6 HOURS PRN
Qty: 8 TABLET | Refills: 0 | Status: SHIPPED | OUTPATIENT
Start: 2019-08-02 | End: 2019-08-12

## 2019-08-02 RX ADMIN — HYDROMORPHONE HYDROCHLORIDE 0.5 MG: 1 INJECTION, SOLUTION INTRAMUSCULAR; INTRAVENOUS; SUBCUTANEOUS at 04:47

## 2019-08-02 RX ADMIN — SODIUM CHLORIDE, SODIUM LACTATE, POTASSIUM CHLORIDE, AND CALCIUM CHLORIDE 75 ML/HR: .6; .31; .03; .02 INJECTION, SOLUTION INTRAVENOUS at 02:11

## 2019-08-02 RX ADMIN — HYDROCODONE BITARTRATE AND ACETAMINOPHEN 1 TABLET: 5; 325 TABLET ORAL at 09:05

## 2019-08-02 RX ADMIN — HYDROMORPHONE HYDROCHLORIDE 0.5 MG: 1 INJECTION, SOLUTION INTRAMUSCULAR; INTRAVENOUS; SUBCUTANEOUS at 00:22

## 2019-08-02 NOTE — PROGRESS NOTES
Progress Note - General Surgery   Victor Hugo Mccullough 34 y o  female MRN: [id number]  Unit/Bed#: -01 Encounter: [id number]    Assessment:  33 y/o F p/w symptomatic cholelithiasis, s/p laparoscopic cholecystectomy on 8/1    Plan:  --Regular diet as tolerated  --d/c IVF  --Pain control  --Likely dc this AM    Subjective/Objective     Subjective:     No acute events overnight  Pt denies any complaints this AM, feels ready to be discharged  Objective:     Blood pressure 113/58, pulse 58, temperature 98 2 °F (36 8 °C), temperature source Oral, resp  rate 18, height 5' 5" (1 651 m), weight 102 kg (225 lb), last menstrual period 07/09/2019, SpO2 98 %, unknown if currently breastfeeding  ,Body mass index is 37 44 kg/m²  Intake/Output Summary (Last 24 hours) at 8/2/2019 0703  Last data filed at 8/2/2019 0234  Gross per 24 hour   Intake 2272 92 ml   Output --   Net 2272 92 ml       Invasive Devices     Peripheral Intravenous Line            Peripheral IV 08/01/19 Left Arm 1 day                Physical Exam:     GEN: NAD  HEENT: MMM  CV: RRR  Lung: normal effort  Ab: Soft, NT/ND, incisions c/d/i  Extrem: No CCE  Neuro:  A+Ox3, motor and sensation grossly intact    Lab, Imaging and other studies:CBC: No results found for: WBC, HGB, HCT, MCV, PLT, ADJUSTEDWBC, MCH, MCHC, RDW, MPV, NRBC, CMP: No results found for: SODIUM, K, CL, CO2, ANIONGAP, BUN, CREATININE, GLUCOSE, CALCIUM, AST, ALT, ALKPHOS, PROT, BILITOT, EGFR, Coagulation: No results found for: PT, INR, APTT, Urinalysis: No results found for: COLORU, CLARITYU, SPECGRAV, PHUR, LEUKOCYTESUR, NITRITE, PROTEINUA, GLUCOSEU, KETONESU, BILIRUBINUR, BLOODU, Amylase: No results found for: AMYLASE, Lipase: No results found for: LIPASE  VTE Pharmacologic Prophylaxis: Sequential compression device (Venodyne)   VTE Mechanical Prophylaxis: sequential compression device

## 2019-08-02 NOTE — QUICK NOTE
Spoke with patient this evening around 2200  She states she is not comfortable with returning home quite yet due to the amount of pain she is in     Will look to discharge patient in the AM

## 2019-08-02 NOTE — PLAN OF CARE
Problem: PAIN - ADULT  Goal: Verbalizes/displays adequate comfort level or baseline comfort level  Description  Interventions:  - Encourage patient to monitor pain and request assistance  - Assess pain using appropriate pain scale  - Administer analgesics based on type and severity of pain and evaluate response  - Implement non-pharmacological measures as appropriate and evaluate response  - Consider cultural and social influences on pain and pain management  - Notify physician/advanced practitioner if interventions unsuccessful or patient reports new pain  Outcome: Progressing     Problem: INFECTION - ADULT  Goal: Absence or prevention of progression during hospitalization  Description  INTERVENTIONS:  - Assess and monitor for signs and symptoms of infection  - Monitor lab/diagnostic results  - Monitor all insertion sites, i e  indwelling lines, tubes, and drains  - Monitor endotracheal (as able) and nasal secretions for changes in amount and color  - [place] appropriate cooling/warming therapies per order  - Administer medications as ordered  - Instruct and encourage patient and family to use good hand hygiene technique  - Identify and instruct in appropriate isolation precautions for identified infection/condition  Outcome: Progressing  Goal: Absence of fever/infection during neutropenic period  Description  INTERVENTIONS:  - Monitor WBC  - Implement neutropenic guidelines  Outcome: Progressing     Problem: SAFETY ADULT  Goal: Patient will remain free of falls  Description  INTERVENTIONS:  - Assess patient frequently for physical needs  -  Identify cognitive and physical deficits and behaviors that affect risk of falls    -  [place] fall precautions as indicated by assessment   - Educate patient/family on patient safety including physical limitations  - Instruct patient to call for assistance with activity based on assessment  - Modify environment to reduce risk of injury  - Consider OT/PT consult to assist with strengthening/mobility  Outcome: Progressing  Goal: Maintain or return to baseline ADL function  Description  INTERVENTIONS:  -  Assess patient's ability to carry out ADLs; assess patient's baseline for ADL function and identify physical deficits which impact ability to perform ADLs (bathing, care of mouth/teeth, toileting, grooming, dressing, etc )  - Assess/evaluate cause of self-care deficits   - Assess range of motion  - Assess patient's mobility; develop plan if impaired  - Assess patient's need for assistive devices and provide as appropriate  - Encourage maximum independence but intervene and supervise when necessary  ¯ Involve family in performance of ADLs  ¯ Assess for home care needs following discharge   ¯ Request OT consult to assist with ADL evaluation and planning for discharge  ¯ Provide patient education as appropriate  Outcome: Progressing  Goal: Maintain or return mobility status to optimal level  Description  INTERVENTIONS:  - Assess patient's baseline mobility status (ambulation, transfers, stairs, etc )    - Identify cognitive and physical deficits and behaviors that affect mobility  - Identify mobility aids required to assist with transfers and/or ambulation (gait belt, sit-to-stand, lift, walker, cane, etc )  - [place] fall precautions as indicated by assessment  - Record patient progress and toleration of activity level on Mobility SBAR; progress patient to next Phase/Stage  - Instruct patient to call for assistance with activity based on assessment  - Request Rehabilitation consult to assist with strengthening/weightbearing, etc   Outcome: Progressing

## 2019-08-04 NOTE — ED PROVIDER NOTES
Pt Name: Michelle Spencer  MRN: [id number]  [place]: 1990  Age/Sex: 34 y o  female  Date of evaluation: 7/31/2019  PCP: No primary care provider on file  CHIEF COMPLAINT    Chief Complaint   Patient presents with    Abdominal Pain Re-Eval     Presents for re-eval of RUQ ABD pain  Seen/evaluated at this facility approx 1wk prior and dx w/ gallstones  Motrin ATC at home non-effective to manage pain  Denies fevers, n/v           HPI    Wilfredo Pippins presents to the Emergency Department complaining of Abdominal Pain  Michelle Spencer is a 34 y o  female who presents due to RUQ Pain  Pt recently seen at this ED on 7/22/2019 for similar episode, though sts this episode has been more severe and now unrelieved with ibuprofen  Patient reports this episode like prior, sharp intermittent pain located in the epigastrium, patient points this region  Patient denies radiation of pain  Patient reports this pain usually goes away without medications  Does not change with position, worsened by tactile pressure  Denies history of high blood pressure, hyperlipidemia, heart issues, blood clots, family history of blood clots, current smoker, does not use oral contraceptives, exogenous estrogen, though notes father did have heart attack prior to 74  Associated symptoms of nausea, lightheadedness, shortness of breath with the intermittent pain  Denies fevers, chills, sweats, back pain, dysuria, hematuria, palpitations, dizziness, visual disturbances, no other complaints at this time  History provided by:  Patient   used: No          Past Medical and Surgical History    History reviewed  No pertinent past medical history  Past Surgical History:   Procedure Laterality Date    CHOLECYSTECTOMY LAPAROSCOPIC N/A 8/1/2019    Procedure: CHOLECYSTECTOMY LAPAROSCOPIC;  Surgeon: Kelly Andersen MD;  Location[facility];  Service: General       History reviewed  No pertinent family history      Social History     Tobacco Use    Smoking status: Current Every Day Smoker     Packs/day: 0 20     Types: Cigarettes     Last attempt to quit: 2018     Years since quittin 9    Smokeless tobacco: Never Used   Substance Use Topics    Alcohol use: No    Drug use: No              Allergies    No Known Allergies    Home Medications:    Prior to Admission medications    Medication Sig Start Date End Date Taking? Authorizing Provider   HYDROcodone-acetaminophen (NORCO) 5-325 mg per tablet Take 1 tablet by mouth every 6 (six) hours as needed for pain for up to 10 daysMax Daily Amount: 4 tablets 19  French Ponce PA-C           Review of Systems    Review of Systems   Constitutional: Negative for activity change, appetite change, chills, diaphoresis, fatigue and fever  HENT: Negative for congestion, dental problem, ear discharge, ear pain, facial swelling, sinus pressure, sinus pain, sore throat, tinnitus and trouble swallowing  Eyes: Negative for photophobia, pain, discharge, redness, itching and visual disturbance  Respiratory: Negative for cough and shortness of breath  Cardiovascular: Negative for chest pain and palpitations  Gastrointestinal: Positive for abdominal pain and nausea  Negative for abdominal distention, blood in stool, constipation, diarrhea and vomiting  Genitourinary: Negative for decreased urine volume, difficulty urinating, dysuria and hematuria  Musculoskeletal: Negative for arthralgias, back pain, gait problem, joint swelling, myalgias, neck pain and neck stiffness  Skin: Negative for pallor, rash and wound  Neurological: Negative for dizziness, tremors, seizures, syncope, weakness, light-headedness, numbness and headaches  Psychiatric/Behavioral: Negative for confusion  All other systems reviewed and negative      Physical Exam      ED Triage Vitals [19 0008]   Temperature Pulse Respirations Blood Pressure SpO2   98 2 °F (36 8 °C) 74 18 140/83 100 %      Temp Source Heart Rate Source Patient Position - Orthostatic VS BP Location FiO2 (%)   Oral Monitor Sitting Right arm --      Pain Score       8               Physical Exam   Constitutional: She is oriented to person, place, and time  She appears well-developed and well-nourished  No distress  HENT:   Head: Normocephalic and atraumatic  Right Ear: External ear normal    Left Ear: External ear normal    Nose: Nose normal    Mouth/Throat: Oropharynx is clear and moist  No oropharyngeal exudate  Eyes: Pupils are equal, round, and reactive to light  Conjunctivae and EOM are normal    Neck: Normal range of motion  Neck supple  Cardiovascular: Normal rate, regular rhythm, normal heart sounds and intact distal pulses  Exam reveals no gallop and no friction rub  No murmur heard  Pulmonary/Chest: Effort normal and breath sounds normal  No respiratory distress  She has no wheezes  She has no rales  She exhibits no tenderness  Abdominal: Soft  Bowel sounds are normal  She exhibits no distension and no mass  There is tenderness (RUQTTP)  There is no rebound and no guarding  No hernia  US performed by me bedside with supervision   Musculoskeletal: Normal range of motion  Neurological: She is alert and oriented to person, place, and time  Skin: Skin is warm  Capillary refill takes less than 2 seconds  She is not diaphoretic             Diagnostic Results    ECG      Labs:    Results for orders placed or performed during the hospital encounter of 08/01/19   CBC and differential   Result Value Ref Range    WBC 9 30 4 31 - 10 16 Thousand/uL    RBC 4 52 3 81 - 5 12 Million/uL    Hemoglobin 12 5 11 5 - 15 4 g/dL    Hematocrit 39 1 34 8 - 46 1 %    MCV 87 82 - 98 fL    MCH 27 7 26 8 - 34 3 pg    MCHC 32 0 31 4 - 37 4 g/dL    RDW 13 7 11 6 - 15 1 %    MPV 8 9 8 9 - 12 7 fL    Platelets 939 020 - 368 Thousands/uL    nRBC 0 /100 WBCs    Neutrophils Relative 68 43 - 75 %    Immat GRANS % 0 0 - 2 % Lymphocytes Relative 21 14 - 44 %    Monocytes Relative 8 4 - 12 %    Eosinophils Relative 2 0 - 6 %    Basophils Relative 1 0 - 1 %    Neutrophils Absolute 6 40 1 85 - 7 62 Thousands/µL    Immature Grans Absolute 0 03 0 00 - 0 20 Thousand/uL    Lymphocytes Absolute 1 93 0 60 - 4 47 Thousands/µL    Monocytes Absolute 0 71 0 17 - 1 22 Thousand/µL    Eosinophils Absolute 0 16 0 00 - 0 61 Thousand/µL    Basophils Absolute 0 07 0 00 - 0 10 Thousands/µL   Comprehensive metabolic panel   Result Value Ref Range    Sodium 142 136 - 145 mmol/L    Potassium 4 3 3 5 - 5 3 mmol/L    Chloride 105 100 - 108 mmol/L    CO2 28 21 - 32 mmol/L    ANION GAP 9 4 - 13 mmol/L    BUN 11 5 - 25 mg/dL    Creatinine 0 71 0 60 - 1 30 mg/dL    Glucose 101 65 - 140 mg/dL    Calcium 9 0 8 3 - 10 1 mg/dL    AST 20 5 - 45 U/L    ALT 25 12 - 78 U/L    Alkaline Phosphatase 99 46 - 116 U/L    Total Protein 7 4 6 4 - 8 2 g/dL    Albumin 3 6 3 5 - 5 0 g/dL    Total Bilirubin 0 10 (L) 0 20 - 1 00 mg/dL    eGFR 117 ml/min/1 73sq m   Lipase   Result Value Ref Range    Lipase 143 73 - 393 u/L   Urine Microscopic   Result Value Ref Range    RBC, UA 0-1 (A) None Seen, 0-5 /hpf    WBC, UA 1-2 (A) None Seen, 0-5, 5-55, 5-65 /hpf    Epithelial Cells Occasional None Seen, Occasional /hpf    Bacteria, UA Occasional None Seen, Occasional /hpf   POCT pregnancy, urine   Result Value Ref Range    EXT PREG TEST UR (Ref: Negative) NEGATIVE     Control Valid    ED Urine Macroscopic   Result Value Ref Range    Color, UA Yellow     Clarity, UA Clear     pH, UA 6 0 4 5 - 8 0    Leukocytes, UA Trace (A) Negative    Nitrite, UA Negative Negative    Protein, UA Negative Negative mg/dl    Glucose, UA Negative Negative mg/dl    Ketones, UA Negative Negative mg/dl    Urobilinogen, UA 0 2 0 2, 1 0 E U /dl E U /dl    Bilirubin, UA Negative Negative    Blood, UA Negative Negative    Specific Gravity, UA 1 025 1 003 - 1 030       All labs reviewed and utilized in the medical decision making process    Radiology:    US gallbladder   Final Result      Cholelithiasis including punctate nonmobile gallstone in the neck of the gallbladder  No sonographic evidence of acute cholecystitis  Follow-up with HIDA scan if clinically warranted  Remainder of the examination is normal       Workstation performed: [id number]         CT abdomen pelvis with contrast   Final Result      Small right-sided corpus luteal cyst             Workstation performed: [id number]             All radiology studies independently viewed by me and interpreted by the radiologist     Procedure    Procedures      Assessment and Plan    MDM  Number of Diagnoses or Management Options  Biliary colic: new, needed workup  Cholelithiasis: new, needed workup     Amount and/or Complexity of Data Reviewed  Clinical lab tests: ordered and reviewed  Tests in the radiology section of CPT®: ordered and reviewed  Tests in the medicine section of CPT®: reviewed and ordered  Review and summarize past medical records: yes  Discuss the patient with other providers: yes  Independent visualization of images, tracings, or specimens: yes    Risk of Complications, Morbidity, and/or Mortality  Presenting problems: moderate  Diagnostic procedures: moderate  Management options: moderate    Patient Progress  Patient progress: stable      Initial ED assessment:  Mo Golden is a 34 y o  female with no significant PMH who presents with RUQ pain  Vitals signs reviewed  Physical examination remarkable for RUQ TTP  Initial Ddx  includes but is not limited to: cholecystitis, biliary colic, choledocholithiasis, appendicitis, gastroenteritis, gastritis, PUD, GERD, gastroparesis, hepatitis, pancreatitis, colitis, enteritis, food poisoning, mesenteric adenitis, IBD, IBS, ileus, bowel obstruction, volvulus, perforated viscus, splenic etiology, diverticulitis, internal hernia, constipation, pelvic pathology, renal colic, pyelonephritis, UTI      Initial ED plan:   Plan will be to perform diagnostic testing of CBC, CMP, Lipase, CT abdomen and treat symptomatically  Final ED summary/disposition: Admitted    MDM  Reviewed: previous chart, nursing note and vitals  Interpretation: labs and CT scan        ED Course of Care and Re-Assessments    ED Course as of Aug 04 0245   u Aug 01, 2019   0041 PREGNANCY TEST URINE: NEGATIVE   0141 TOTAL BILIRUBIN(!): 0 10   0142 No elevated transaminases   Comprehensive metabolic panel(!)   5607 8:73 AM   Bedside US performed with gallstones, no pericholecystic fluid performed by me  Discussed course of care with patient, who is agreeable to admission for further eval, treatment  Called Gen Surgical resident WILBER/Resident/Attending who is aware of the patient, case discussed including HPI, pertinent PMH, ED Course, and workup   Hospitalist agreed with plan and will accept for admission/observation under the service of Dr Peres Filler                                   Medications   ketorolac (TORADOL) injection 15 mg (15 mg Intravenous Given 8/1/19 0043)   ondansetron (ZOFRAN) injection 4 mg (4 mg Intravenous Given 8/1/19 0044)   iohexol (OMNIPAQUE) 350 MG/ML injection (MULTI-DOSE) 100 mL (100 mL Intravenous Given 8/1/19 0137)   ketorolac (TORADOL) injection 15 mg (15 mg Intravenous Given 8/1/19 0415)   ceFAZolin (ANCEF) IVPB (premix) 2,000 mg (2,000 mg Intravenous New Bag 8/1/19 0510)   metroNIDAZOLE (FLAGYL) IVPB (premix) 500 mg (500 mg Intravenous New Bag 8/1/19 0416)   fentaNYL (SUBLIMAZE) injection 25 mcg (25 mcg Intravenous Given 8/1/19 7367)   HYDROmorphone (DILAUDID) injection 0 2 mg (0 2 mg Intravenous Given 8/1/19 1900)         FINAL IMPRESSION    Final diagnoses:   Biliary colic   Cholelithiasis         DISPOSITION/PLAN  Time reflects when diagnosis was documented in both MDM as applicable and the Disposition within this note     Time User Action Codes Description Comment    8/1/2019  2:44 AM Seth Morales Add [R10 9] Abdominal pain     8/1/2019  2:45 AM Arvell Rist Remove [R10 9] Abdominal pain     8/1/2019  2:45 AM Arvell Rist Add [J12 68] Biliary colic     2/0/0673  9:08 AM Arvell Rist Add [K80 20] Cholelithiasis     8/1/2019  9:42 AM Onel Mullen Add [K80 20] Calculus of gallbladder without cholecystitis without obstruction     8/1/2019  3:02 PM Mayo Myers Modify [K80 20] Cholelithiasis     8/1/2019 10:20 PM Cesar Fisher Modify [K80 20] Cholelithiasis       ED Disposition     ED Disposition Condition Date/Time Comment    Admit  Thu Aug 1, 2019  4:06 AM Admitting Physician: Varsha Moreira [[id number]]   Level of Care: Med Surg [16]        Follow-up Information     Follow up With Specialties Details Why Contact Info Additional [address] Emergency Department Emergency Medicine  Return to ED [address] [facility] [address] [phone] [facility], [address] MD [facility] General Surgery Follow up please call office to schedule follow up appointment for two weeks after surgery [address] [place] [address]                 PATIENT REFERRED TO:    [facility] Emergency [address] [facility]  [phone]    Return to Marino Sanchez MD  [address]  [address] [address]  [phone]    Follow up  please call office to schedule follow up appointment for two weeks after surgery      DISCHARGE MEDICATIONS:    Discharge Medication List as of 8/2/2019 10:45 AM      START taking these medications    Details   HYDROcodone-acetaminophen (NORCO) 5-325 mg per tablet Take 1 tablet by mouth every 6 (six) hours as needed for pain for up to 10 daysMax Daily Amount: 4 tablets, Starting Fri 8/2/2019, Until Mon 8/12/2019, Print             Outpatient Discharge Orders   Discharge Diet     Activity as tolerated     Lifting restrictions Call provider for:  persistent nausea or vomiting     Call provider for:  severe uncontrolled pain     Call provider for:  redness, tenderness, or signs of infection (pain, swelling, redness, odor or green/yellow discharge around incision site)     No dressing needed            Zully Chiu PA-C  08/04/19 7138

## 2019-12-10 ENCOUNTER — APPOINTMENT (EMERGENCY)
Dept: RADIOLOGY | Facility: HOSPITAL | Age: 29
End: 2019-12-10
Payer: COMMERCIAL

## 2019-12-10 ENCOUNTER — HOSPITAL ENCOUNTER (EMERGENCY)
Facility: HOSPITAL | Age: 29
Discharge: HOME | End: 2019-12-10
Attending: EMERGENCY MEDICINE | Admitting: EMERGENCY MEDICINE
Payer: COMMERCIAL

## 2019-12-10 VITALS
DIASTOLIC BLOOD PRESSURE: 60 MMHG | TEMPERATURE: 97.8 F | RESPIRATION RATE: 18 BRPM | SYSTOLIC BLOOD PRESSURE: 123 MMHG | HEART RATE: 77 BPM | OXYGEN SATURATION: 99 %

## 2019-12-10 DIAGNOSIS — M54.50 THORACOLUMBAR BACK PAIN: Primary | ICD-10-CM

## 2019-12-10 DIAGNOSIS — M54.6 THORACOLUMBAR BACK PAIN: Primary | ICD-10-CM
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
## 2019-12-10 PROCEDURE — 72100 X-RAY EXAM L-S SPINE 2/3 VWS: CPT

## 2019-12-10 PROCEDURE — 99284 EMERGENCY DEPT VISIT MOD MDM: CPT | Performed by: EMERGENCY MEDICINE

## 2019-12-10 PROCEDURE — 72072 X-RAY EXAM THORAC SPINE 3VWS: CPT

## 2019-12-10 PROCEDURE — 99284 EMERGENCY DEPT VISIT MOD MDM: CPT

## 2019-12-10 RX ORDER — IBUPROFEN 400 MG/1
800 TABLET ORAL ONCE
Status: COMPLETED | OUTPATIENT
Start: 2019-12-10 | End: 2019-12-10

## 2019-12-10 RX ORDER — IBUPROFEN 800 MG/1
800 TABLET ORAL 3 TIMES DAILY
Qty: 21 TABLET | Refills: 0 | Status: SHIPPED | OUTPATIENT
Start: 2019-12-10

## 2019-12-10 RX ORDER — CYCLOBENZAPRINE HCL 10 MG
10 TABLET ORAL 3 TIMES DAILY PRN
Qty: 15 TABLET | Refills: 0 | Status: SHIPPED | OUTPATIENT
Start: 2019-12-10

## 2019-12-10 RX ORDER — CYCLOBENZAPRINE HCL 10 MG
10 TABLET ORAL ONCE
Status: COMPLETED | OUTPATIENT
Start: 2019-12-10 | End: 2019-12-10

## 2019-12-10 RX ADMIN — CYCLOBENZAPRINE HYDROCHLORIDE 10 MG: 10 TABLET, FILM COATED ORAL at 21:09

## 2019-12-10 RX ADMIN — IBUPROFEN 800 MG: 400 TABLET ORAL at 21:09
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
## 2020-02-23 VITALS
SYSTOLIC BLOOD PRESSURE: 99 MMHG | TEMPERATURE: 98.5 F | HEART RATE: 92 BPM | DIASTOLIC BLOOD PRESSURE: 70 MMHG | RESPIRATION RATE: 16 BRPM | BODY MASS INDEX: 37.42 KG/M2 | WEIGHT: 224.87 LBS | OXYGEN SATURATION: 100 %

## 2020-02-23 PROCEDURE — 99282 EMERGENCY DEPT VISIT SF MDM: CPT

## 2020-02-24 ENCOUNTER — HOSPITAL ENCOUNTER (EMERGENCY)
Facility: HOSPITAL | Age: 30
Discharge: HOME | End: 2020-02-24
Attending: EMERGENCY MEDICINE | Admitting: EMERGENCY MEDICINE
Payer: COMMERCIAL

## 2020-02-24 ENCOUNTER — HOSPITAL ENCOUNTER (EMERGENCY)
Facility: HOSPITAL | Age: 30
Discharge: HOME | End: 2020-02-24
Attending: EMERGENCY MEDICINE
Payer: COMMERCIAL

## 2020-02-24 VITALS
SYSTOLIC BLOOD PRESSURE: 122 MMHG | HEART RATE: 88 BPM | OXYGEN SATURATION: 99 % | DIASTOLIC BLOOD PRESSURE: 62 MMHG | BODY MASS INDEX: 37.32 KG/M2 | RESPIRATION RATE: 16 BRPM | HEIGHT: 65 IN | TEMPERATURE: 98.4 F | WEIGHT: 224 LBS

## 2020-02-24 DIAGNOSIS — K04.7 DENTAL INFECTION: Primary | ICD-10-CM

## 2020-02-24 DIAGNOSIS — R22.0 RIGHT FACIAL SWELLING: ICD-10-CM

## 2020-02-24 DIAGNOSIS — K04.7 DENTAL ABSCESS: Primary | ICD-10-CM

## 2020-02-24 PROCEDURE — 99284 EMERGENCY DEPT VISIT MOD MDM: CPT | Performed by: EMERGENCY MEDICINE

## 2020-02-24 PROCEDURE — 99283 EMERGENCY DEPT VISIT LOW MDM: CPT

## 2020-02-24 PROCEDURE — 96365 THER/PROPH/DIAG IV INF INIT: CPT

## 2020-02-24 PROCEDURE — 96375 TX/PRO/DX INJ NEW DRUG ADDON: CPT

## 2020-02-24 PROCEDURE — 64450 NJX AA&/STRD OTHER PN/BRANCH: CPT | Performed by: EMERGENCY MEDICINE

## 2020-02-24 RX ORDER — AMOXICILLIN AND CLAVULANATE POTASSIUM 875; 125 MG/1; MG/1
1 TABLET, FILM COATED ORAL ONCE
Status: COMPLETED | OUTPATIENT
Start: 2020-02-24 | End: 2020-02-24

## 2020-02-24 RX ORDER — ACETAMINOPHEN 325 MG/1
650 TABLET ORAL ONCE
Status: COMPLETED | OUTPATIENT
Start: 2020-02-24 | End: 2020-02-24

## 2020-02-24 RX ORDER — HYDROCODONE BITARTRATE AND ACETAMINOPHEN 5; 325 MG/1; MG/1
1 TABLET ORAL EVERY 6 HOURS PRN
Qty: 12 TABLET | Refills: 0 | Status: SHIPPED | OUTPATIENT
Start: 2020-02-24

## 2020-02-24 RX ORDER — BUPIVACAINE HYDROCHLORIDE 5 MG/ML
5 INJECTION, SOLUTION EPIDURAL; INTRACAUDAL ONCE
Status: COMPLETED | OUTPATIENT
Start: 2020-02-24 | End: 2020-02-24

## 2020-02-24 RX ORDER — AMOXICILLIN AND CLAVULANATE POTASSIUM 875; 125 MG/1; MG/1
1 TABLET, FILM COATED ORAL 2 TIMES DAILY
Qty: 14 TABLET | Refills: 0 | Status: SHIPPED | OUTPATIENT
Start: 2020-02-24 | End: 2020-03-02

## 2020-02-24 RX ORDER — HYDROMORPHONE HCL/PF 1 MG/ML
0.5 SYRINGE (ML) INJECTION ONCE
Status: COMPLETED | OUTPATIENT
Start: 2020-02-24 | End: 2020-02-24

## 2020-02-24 RX ADMIN — ACETAMINOPHEN 650 MG: 325 TABLET, FILM COATED ORAL at 01:01

## 2020-02-24 RX ADMIN — AMOXICILLIN AND CLAVULANATE POTASSIUM 1 TABLET: 875; 125 TABLET, FILM COATED ORAL at 01:01

## 2020-02-24 RX ADMIN — HYDROMORPHONE HYDROCHLORIDE 0.5 MG: 1 INJECTION, SOLUTION INTRAMUSCULAR; INTRAVENOUS; SUBCUTANEOUS at 20:50

## 2020-02-24 RX ADMIN — SODIUM CHLORIDE 3 G: 9 INJECTION, SOLUTION INTRAVENOUS at 20:54

## 2020-02-24 RX ADMIN — BUPIVACAINE HYDROCHLORIDE 5 ML: 5 INJECTION, SOLUTION EPIDURAL; INTRACAUDAL at 01:02

## 2020-02-24 NOTE — ED PROVIDER NOTES
History  Chief Complaint   Patient presents with    Dental Pain     Pt presents to the ED with c/o R sided dental pain and swelling that started yesterday  History provided by:  Patient   used: No     33 y/o otherwise healthy female presented for eval of right upper dental pain since yesterday --- moderate to severe, constant, localized to tooth #6 (canine) and surrounding gums  No relief from OTC meds  She has associated facial swelling on the right  No fever, chills, or other systemic sx  Poor dentition overall with tenderness to percussion of tooth  PDMP reviewed and appropriate  Hx and exam consistent with infection, possible abscess  Plan pain, control, abx  Will need dental f/u  Prior to Admission Medications   Prescriptions Last Dose Informant Patient Reported? Taking? cyclobenzaprine (FLEXERIL) 10 mg tablet   No No   Sig: Take 1 tablet (10 mg total) by mouth 3 (three) times a day as needed for muscle spasms   ibuprofen (MOTRIN) 800 mg tablet   No No   Sig: Take 1 tablet (800 mg total) by mouth 3 (three) times a day      Facility-Administered Medications: None       History reviewed  No pertinent past medical history  Past Surgical History:   Procedure Laterality Date    CHOLECYSTECTOMY      CHOLECYSTECTOMY LAPAROSCOPIC N/A 2019    Procedure: CHOLECYSTECTOMY LAPAROSCOPIC;  Surgeon: Kathe Vazquez MD;  Location: [facility];  Service: General       History reviewed  No pertinent family history  I have reviewed and agree with the history as documented  Social History     Tobacco Use    Smoking status: Current Every Day Smoker     Packs/day: 0 20     Types: Cigarettes     Last attempt to quit: 2018     Years since quittin 5    Smokeless tobacco: Never Used   Substance Use Topics    Alcohol use: No    Drug use: No       Review of Systems   Constitutional: Negative for activity change, appetite change, fatigue and fever     HENT: Positive for dental problem and facial swelling  Respiratory: Negative for cough, chest tightness and shortness of breath  Gastrointestinal: Negative for abdominal pain, nausea and vomiting  Musculoskeletal: Negative for back pain and neck pain  Skin: Negative for color change, rash and wound  Neurological: Negative for dizziness and weakness  All other systems reviewed and are negative  Physical Exam  Physical Exam   Constitutional: She is oriented to person, place, and time  She appears well-developed and well-nourished  No distress  HENT:   Head: Normocephalic  Facial swelling on right without erythema/cellulitis  Generalized poor dentition  Multiple cavities  Tenderness to percussion of tooth 6  Neck: Normal range of motion  Neck supple  Cardiovascular: Normal rate and regular rhythm  Pulmonary/Chest: Effort normal  No respiratory distress  Lymphadenopathy:     She has no cervical adenopathy  Neurological: She is alert and oriented to person, place, and time  No cranial nerve deficit  Skin: Skin is warm and dry  No erythema  Psychiatric: She has a normal mood and affect  Her behavior is normal    Nursing note and vitals reviewed        Vital Signs  ED Triage Vitals [02/23/20 2355]   Temperature Pulse Respirations Blood Pressure SpO2   98 5 °F (36 9 °C) 92 16 99/70 100 %      Temp Source Heart Rate Source Patient Position - Orthostatic VS BP Location FiO2 (%)   Oral Monitor Sitting Left arm --      Pain Score       6           Vitals:    02/23/20 2355   BP: 99/70   Pulse: 92   Patient Position - Orthostatic VS: Sitting         Visual Acuity      ED Medications  Medications   acetaminophen (TYLENOL) tablet 650 mg (650 mg Oral Given 2/24/20 0101)   amoxicillin-clavulanate (AUGMENTIN) 875-125 mg per tablet 1 tablet (1 tablet Oral Given 2/24/20 0101)   bupivacaine (PF) (MARCAINE) 0 5 % injection 5 mL (5 mL Infiltration Given by Other 2/24/20 0102)       Diagnostic Studies  Results Reviewed     None No orders to display              Procedures  Nerve block  Date/Time: 2/24/2020 1:05 AM  Performed by: Ari Solis MD  Authorized by: Ari Solis MD     Patient location:  ED  Consent:     Consent obtained:  Verbal    Consent given by:  Patient  Universal protocol:     Patient identity confirmed:  Verbally with patient  Indications:     Indications:  Pain relief  Location:     Nerve block body site: supraperiostial tooth #6  Laterality:  Right  Procedure details (see MAR for exact dosages): Block needle gauge:  27 G    Anesthetic injected:  Bupivacaine 0 5% w/o epi    Steroid injected:  None    Additive injected:  None    Injection procedure:  Anatomic landmarks identified, incremental injection and negative aspiration for blood  Post-procedure details:     Dressing:  None    Outcome:  Pain improved    Patient tolerance of procedure: Tolerated well, no immediate complications             ED Course                               MDM  Number of Diagnoses or Management Options  Dental infection: new and requires workup  Diagnosis management comments: 35 y/o female with pain of right upper canine since yesterday with associated facial swelling but no fever  Improvement in pain with supraperiosteal block  Started augmentin in ED  Referred to dental f/u  Discussed need to return for signs of worsening infection including fever, uncontrolled pain, worsening swelling  Patient Progress  Patient progress: improved        Disposition  Final diagnoses:   Dental infection     Time reflects when diagnosis was documented in both MDM as applicable and the Disposition within this note     Time User Action Codes Description Comment    2/24/2020  1:11 AM Yesi Rodrigez Add [K04 7] Dental infection       ED Disposition     ED Disposition Condition Date/Time Comment    Discharge Stable Mon Feb 24, 2020  1:11 AM Sri Willams discharge to home/self care              Follow-up Information     Follow up With Specialties Details Why Contact Info    [facility]    [phone]  [phone]          Discharge Medication List as of 2/24/2020  1:14 AM      START taking these medications    Details   amoxicillin-clavulanate (AUGMENTIN) 875-125 mg per tablet Take 1 tablet by mouth 2 (two) times a day for 7 days, Starting Mon 2/24/2020, Until Mon 3/2/2020, Normal      HYDROcodone-acetaminophen (NORCO) 5-325 mg per tablet Take 1 tablet by mouth every 6 (six) hours as needed for painMax Daily Amount: 4 tablets, Starting Mon 2/24/2020, Normal         CONTINUE these medications which have NOT CHANGED    Details   cyclobenzaprine (FLEXERIL) 10 mg tablet Take 1 tablet (10 mg total) by mouth 3 (three) times a day as needed for muscle spasms, Starting Tue 12/10/2019, Normal      ibuprofen (MOTRIN) 800 mg tablet Take 1 tablet (800 mg total) by mouth 3 (three) times a day, Starting Tue 12/10/2019, Normal           No discharge procedures on file      PDMP Review       Value Time User    PDMP Reviewed  Yes 2/24/2020  1:13 AM Ari Solis MD          ED Provider  Electronically Signed by           Ari Solis MD  02/24/20 2548

## 2020-02-25 NOTE — DISCHARGE INSTRUCTIONS
Continue antibiotics as previously prescribed  Contact the oral surgery office tomorrow morning after 08:00 to schedule a same-day appointment for further evaluation and treatment

## 2020-02-29 NOTE — ED PROVIDER NOTES
History  Chief Complaint   Patient presents with    Facial Swelling     Pt reports seen in ED for dental problem  Pt reports right side of face more swollen than previously  Patient is a 44-year-old female who presents to the emergency department expressing concern for increased right facial swelling  She notes that yesterday morning she awoke with tooth pain  She notes that she went to sleep at 06:30 in the evening and awoke 4 hours later appreciating that her face had puffed up    She notes that she sought medical attention in the emergency department and had an anesthetic injected  She notes that she has taken 2 doses of the prescribed antibiotic (Augmentin) and the amount of swelling has doubled in size    She relates that she called the number provided for follow-up and that she was told that all the offices were booked until next week    She became especially concerned at 16:00 today when this swelling was appreciated near my eye    She denies appreciating any vision change including on all directions of gaze  There is no diplopia  She denies having appreciated any fever and does not generally feel ill  She has not appreciated any intraoral drainage  Prior to Admission Medications   Prescriptions Last Dose Informant Patient Reported? Taking? HYDROcodone-acetaminophen (NORCO) 5-325 mg per tablet   No No   Sig: Take 1 tablet by mouth every 6 (six) hours as needed for painMax Daily Amount: 4 tablets   amoxicillin-clavulanate (AUGMENTIN) 875-125 mg per tablet   No No   Sig: Take 1 tablet by mouth 2 (two) times a day for 7 days   cyclobenzaprine (FLEXERIL) 10 mg tablet   No No   Sig: Take 1 tablet (10 mg total) by mouth 3 (three) times a day as needed for muscle spasms   ibuprofen (MOTRIN) 800 mg tablet   No No   Sig: Take 1 tablet (800 mg total) by mouth 3 (three) times a day      Facility-Administered Medications: None       History reviewed  No pertinent past medical history      Past Surgical History:   Procedure Laterality Date    CHOLECYSTECTOMY      CHOLECYSTECTOMY LAPAROSCOPIC N/A 2019    Procedure: CHOLECYSTECTOMY LAPAROSCOPIC;  Surgeon: Tayler Sebastian MD;  Location[facility];  Service: General       History reviewed  No pertinent family history  I have reviewed and agree with the history as documented  E-Cigarette/Vaping     E-Cigarette/Vaping Substances     Social History     Tobacco Use    Smoking status: Current Every Day Smoker     Packs/day: 0 20     Types: Cigarettes     Last attempt to quit: 2018     Years since quittin 5    Smokeless tobacco: Never Used   Substance Use Topics    Alcohol use: No    Drug use: No       Review of Systems   All other systems reviewed and are negative  Physical Exam  Physical Exam   Constitutional: She appears well-developed and well-nourished  Patient appears mild to moderately uncomfortable  There is markedly right maxillary swelling with extension to the infraorbital region  No discoloration or increased warmth appreciated in this region   HENT:   Head: Normocephalic  Mouth/Throat: Oropharynx is clear and moist  No uvula swelling  Eyes: Pupils are equal, round, and reactive to light  Conjunctivae and EOM are normal    Patient is able to open right eye well despite swelling  Conjunctivas unremarkable  Pupils are briskly reactive to light  EOMI  Patent denies vision change with consensual eye ranging  Cardiovascular: Normal rate  Pulmonary/Chest: Effort normal    Neurological: She is alert  Skin: Skin is warm and dry  No rash noted  Nursing note and vitals reviewed        Vital Signs  ED Triage Vitals [20]   Temperature Pulse Respirations Blood Pressure SpO2   98 4 °F (36 9 °C) 88 16 122/62 99 %      Temp src Heart Rate Source Patient Position - Orthostatic VS BP Location FiO2 (%)   -- -- -- -- --      Pain Score       7           Vitals:    20   BP: 122/62   Pulse: 88 Visual Acuity      ED Medications  Medications   HYDROmorphone (DILAUDID) injection 0 5 mg (0 5 mg Intravenous Given 2/24/20 2050)   ampicillin-sulbactam (UNASYN) 3 g in sodium chloride 0 9 % 100 mL IVPB (0 g Intravenous Stopped 2/24/20 2118)       Diagnostic Studies  Results Reviewed     None                 No orders to display              Procedures  Procedures         ED Course      I spoke with the on-call oral surgeon-Dr Lozano  He notes that his office would be able to get patient in for same-day appointment if she called tomorrow morning  (OMFS is separate from the general dental office which patient had contacted)  Patient is afebrile and without evidence of sepsis  He recommended use of a penicillin based medication if not already initiated including 1 IV dose in the emergency department  He did take her medical record number and indicated that he would his office staff contact her to arrange for an appointment tomorrow  Patient was quite pleased with this option/treatment as opposed to requiring admission  RN aware as was physician to care was briefly transferred following the end of my shift-Dr Chaves  Plan was for patient discharge following receipt of IV antibiotic  MDM      Disposition  Final diagnoses:   Dental abscess   Right facial swelling     Time reflects when diagnosis was documented in both MDM as applicable and the Disposition within this note     Time User Action Codes Description Comment    2/24/2020  9:02 PM Ed BATES Add [K04 7] Dental abscess     2/24/2020  9:02 PM Ed BATES Add [R22 0] Right facial swelling       ED Disposition     ED Disposition Condition Date/Time Comment    Discharge Stable Mon Feb 24, 2020  9:02 PM Olman Fernandes discharge to home/self care              Follow-up Information     Follow up With Specialties Details Why Contact Info    Saeed Smith DMD Oral Maxillofacial Surgery Call Tomorrow morning to schedule a same-day appointment for further evaluation and treatment [address] [facility] [address]            Discharge Medication List as of 2/24/2020 10:42 PM      CONTINUE these medications which have NOT CHANGED    Details   amoxicillin-clavulanate (AUGMENTIN) 875-125 mg per tablet Take 1 tablet by mouth 2 (two) times a day for 7 days, Starting Mon 2/24/2020, Until Mon 3/2/2020, Normal      cyclobenzaprine (FLEXERIL) 10 mg tablet Take 1 tablet (10 mg total) by mouth 3 (three) times a day as needed for muscle spasms, Starting Tue 12/10/2019, Normal      HYDROcodone-acetaminophen (NORCO) 5-325 mg per tablet Take 1 tablet by mouth every 6 (six) hours as needed for painMax Daily Amount: 4 tablets, Starting Mon 2/24/2020, Normal      ibuprofen (MOTRIN) 800 mg tablet Take 1 tablet (800 mg total) by mouth 3 (three) times a day, Starting Tue 12/10/2019, Normal           No discharge procedures on file      PDMP Review       Value Time User    PDMP Reviewed  Yes 2/24/2020  1:13 AM Blane Garcia MD          ED Provider  Electronically Signed by           Aries Brown MD  02/28/20 6906

## (undated) DEVICE — CHLORAPREP HI-LITE 26ML ORANGE

## (undated) DEVICE — UNDYED BRAIDED (POLYGLACTIN 910), SYNTHETIC ABSORBABLE SUTURE: Brand: COATED VICRYL

## (undated) DEVICE — VIAL DECANTER

## (undated) DEVICE — PAD GROUNDING ADULT

## (undated) DEVICE — TROCAR: Brand: KII FIOS FIRST ENTRY

## (undated) DEVICE — TROCAR APPPLE 5MM EXTENDED LENGTH

## (undated) DEVICE — SCD SEQUENTIAL COMPRESSION COMFORT SLEEVE MEDIUM KNEE LENGTH: Brand: KENDALL SCD

## (undated) DEVICE — ADHESIVE SKN CLSR HISTOACRYL FLEX 0.5ML LF

## (undated) DEVICE — DRAPE EQUIPMENT RF WAND

## (undated) DEVICE — INTENDED FOR TISSUE SEPARATION, AND OTHER PROCEDURES THAT REQUIRE A SHARP SURGICAL BLADE TO PUNCTURE OR CUT.: Brand: BARD-PARKER SAFETY BLADES SIZE 11, STERILE

## (undated) DEVICE — LIGHT HANDLE COVER SLEEVE DISP BLUE STELLAR

## (undated) DEVICE — LIGAMAX 5 MM ENDOSCOPIC MULTIPLE CLIP APPLIER: Brand: LIGAMAX

## (undated) DEVICE — SUT MONOCRYL 4-0 PS-2 27 IN Y426H

## (undated) DEVICE — STERILE SURGICAL LUBRICANT,  TUBE: Brand: SURGILUBE

## (undated) DEVICE — TISSUE RETRIEVAL SYSTEM: Brand: INZII RETRIEVAL SYSTEM

## (undated) DEVICE — IRRIG ENDO FLO TUBING

## (undated) DEVICE — ELECTRODE LAP J HOOK SPLIT STEM E-Z CLEAN 33CM -0021S

## (undated) DEVICE — NEEDLE 22 G X 1 1/2 SAFETY

## (undated) DEVICE — GLOVE SRG BIOGEL ECLIPSE 7

## (undated) DEVICE — ALLENTOWN LAP CHOLE APP PACK: Brand: CARDINAL HEALTH